# Patient Record
Sex: FEMALE | Race: WHITE | Employment: FULL TIME | URBAN - METROPOLITAN AREA
[De-identification: names, ages, dates, MRNs, and addresses within clinical notes are randomized per-mention and may not be internally consistent; named-entity substitution may affect disease eponyms.]

---

## 2017-02-14 PROBLEM — N95.9 PERIMENOPAUSAL DISORDER: Status: ACTIVE | Noted: 2017-02-14

## 2017-02-14 PROBLEM — F32.81 PREMENSTRUAL DYSPHORIC DISORDER: Status: ACTIVE | Noted: 2017-02-14

## 2017-02-14 PROBLEM — E74.39 GLUCOSE INTOLERANCE (NO MALABSORPTION): Status: ACTIVE | Noted: 2017-02-14

## 2017-08-14 PROBLEM — L70.0 ACNE CYSTICA: Status: ACTIVE | Noted: 2017-08-14

## 2017-11-29 ENCOUNTER — HOSPITAL ENCOUNTER (OUTPATIENT)
Dept: MAMMOGRAPHY | Age: 47
Discharge: HOME | End: 2017-11-29
Attending: OBSTETRICS & GYNECOLOGY
Payer: COMMERCIAL

## 2017-11-29 DIAGNOSIS — Z12.31 VISIT FOR SCREENING MAMMOGRAM: ICD-10-CM

## 2017-11-29 PROCEDURE — 77067 SCR MAMMO BI INCL CAD: CPT

## 2018-08-21 PROBLEM — M54.50 CHRONIC LOW BACK PAIN WITHOUT SCIATICA: Status: ACTIVE | Noted: 2018-08-21

## 2018-08-21 PROBLEM — G89.29 CHRONIC LOW BACK PAIN WITHOUT SCIATICA: Status: ACTIVE | Noted: 2018-08-21

## 2018-08-21 PROBLEM — J30.1 ALLERGIC RHINITIS DUE TO POLLEN: Status: ACTIVE | Noted: 2018-08-21

## 2018-10-04 PROBLEM — M54.40 CHRONIC LOW BACK PAIN WITH SCIATICA: Status: ACTIVE | Noted: 2018-08-21

## 2018-10-30 PROBLEM — M54.16 LUMBAR RADICULOPATHY: Status: ACTIVE | Noted: 2018-10-30

## 2018-10-30 PROBLEM — M43.16 SPONDYLOLISTHESIS AT L4-L5 LEVEL: Status: ACTIVE | Noted: 2018-10-30

## 2018-12-04 ENCOUNTER — HOSPITAL ENCOUNTER (OUTPATIENT)
Dept: MAMMOGRAPHY | Age: 48
Discharge: HOME | End: 2018-12-04
Attending: OBSTETRICS & GYNECOLOGY
Payer: COMMERCIAL

## 2018-12-04 DIAGNOSIS — Z12.39 ENCOUNTER FOR SCREENING FOR MALIGNANT NEOPLASM OF BREAST: ICD-10-CM

## 2018-12-04 PROCEDURE — 77067 SCR MAMMO BI INCL CAD: CPT

## 2018-12-18 NOTE — H&P
[address] [employer]  HISTORY AND PHYSICAL      Reagan Roldan  MR#: [id number]  : 1970  ACCOUNT #: [de-identified]   ADMIT DATE: 2018    CHIEF COMPLAINT:  Left lower extremity radicular pain x8 months. HISTORY OF PRESENT ILLNESS:  A 62-year-old lady with left lower extremity radicular pain refractory to physical therapy, chiropractic treatment and epidural steroid injection. Walking does not aggravate the discomfort as much as sitting. She does wear a TENS unit. MRI scanning of the lumbar spine reveals grade I spondylolisthesis and left-sided spinal stenosis L4-5 with compression. She entered the hospital for surgery as conservative measures have failed. PAST MEDICAL HISTORY:  Significant for tremor migraine and post-menstrual dysphoric disorder. ALLERGIES:  OXYCODONE.    SOCIAL HISTORY:  She is . She is a nonsmoker and minimal ethanol consumer. FAMILY HISTORY:  Positive for hypertension, asthma, hyperlipidemia, coronary artery disease and cancer. REVIEW OF SYSTEMS:  Negative for chest pain, shortness of breath or fatigue. MEDICATIONS:  Listed on the universal medication form attached to the chart. Please refer to that form. PHYSICAL EXAMINATION:  VITAL SIGNS:  Stable, afebrile. HEENT:  Unremarkable. Nose and throat clear. CHEST:  Clear bilaterally. HEART:  Regular rate and rhythm. No murmurs, rubs or gallops. ABDOMEN:  Soft, benign, nontender, no masses. Bowel sounds positive. EXTREMITIES:  Free of deformities. NEUROLOGIC:  Awake, alert, oriented x3. Cranial nerves II-XII intact. Motor strength 5/5. Reflexes symmetric. Sensation normal.  No pathological reflexes present. IMPRESSION:  Grade I spondylolisthesis and spinal stenosis L4-5. Conservative measures have failed. PLAN:  L4-5 transforaminal lumbar interbody fusion.   The risks were thoroughly explained and include bleeding, infection, weakness, numbness, persistent pain, nonunion, malunion incontinence, persistent pain, paralysis and death. She understands and agrees to proceed.       MD ANTOINETTE Prescott/PORFIRIO  D: 12/18/2018 08:01     T: 12/18/2018 08:13  JOB #: [id number]

## 2019-01-04 PROBLEM — M79.2 RADICULAR PAIN IN RIGHT ARM: Status: ACTIVE | Noted: 2019-01-04

## 2019-01-04 PROBLEM — L68.0 FEMALE HIRSUTISM: Status: ACTIVE | Noted: 2019-01-04

## 2019-01-08 ENCOUNTER — HOSPITAL ENCOUNTER (OUTPATIENT)
Dept: GENERAL RADIOLOGY | Age: 49
Discharge: HOME | End: 2019-01-08
Attending: INTERNAL MEDICINE
Payer: COMMERCIAL

## 2019-01-08 DIAGNOSIS — M79.2 RADICULAR PAIN IN RIGHT ARM: ICD-10-CM

## 2019-01-08 PROCEDURE — 72040 X-RAY EXAM NECK SPINE 2-3 VW: CPT

## 2019-01-09 ENCOUNTER — HOSPITAL ENCOUNTER (OUTPATIENT)
Dept: SURGERY | Age: 49
Discharge: HOME | End: 2019-01-09
Payer: COMMERCIAL

## 2019-01-09 VITALS
OXYGEN SATURATION: 100 % | TEMPERATURE: 98 F | HEIGHT: 64 IN | RESPIRATION RATE: 18 BRPM | BODY MASS INDEX: 19.04 KG/M2 | WEIGHT: 111.5 LBS | SYSTOLIC BLOOD PRESSURE: 105 MMHG | HEART RATE: 96 BPM | DIASTOLIC BLOOD PRESSURE: 79 MMHG

## 2019-01-09 LAB
ANION GAP SERPL CALC-SCNC: 10 MMOL/L (ref 7–16)
APPEARANCE UR: CLEAR
BACTERIA SPEC CULT: NORMAL
BASOPHILS # BLD: 0 K/UL (ref 0–0.2)
BASOPHILS NFR BLD: 1 % (ref 0–2)
BILIRUB UR QL: NEGATIVE
BUN SERPL-MCNC: 16 MG/DL (ref 6–23)
CALCIUM SERPL-MCNC: 9.5 MG/DL (ref 8.3–10.4)
CHLORIDE SERPL-SCNC: 100 MMOL/L (ref 98–107)
CO2 SERPL-SCNC: 26 MMOL/L (ref 21–32)
COLOR UR: YELLOW
CREAT SERPL-MCNC: 0.79 MG/DL (ref 0.6–1)
DIFFERENTIAL METHOD BLD: NORMAL
EOSINOPHIL # BLD: 0.1 K/UL (ref 0–0.8)
EOSINOPHIL NFR BLD: 2 % (ref 0.5–7.8)
ERYTHROCYTE [DISTWIDTH] IN BLOOD BY AUTOMATED COUNT: 12.3 % (ref 11.9–14.6)
GLUCOSE SERPL-MCNC: 88 MG/DL (ref 65–100)
GLUCOSE UR STRIP.AUTO-MCNC: NEGATIVE MG/DL
HCT VFR BLD AUTO: 43.2 % (ref 35.8–46.3)
HGB BLD-MCNC: 14.4 G/DL (ref 11.7–15.4)
HGB UR QL STRIP: NEGATIVE
IMM GRANULOCYTES # BLD AUTO: 0 K/UL (ref 0–0.5)
IMM GRANULOCYTES NFR BLD AUTO: 0 % (ref 0–5)
KETONES UR QL STRIP.AUTO: NEGATIVE MG/DL
LEUKOCYTE ESTERASE UR QL STRIP.AUTO: NEGATIVE
LYMPHOCYTES # BLD: 2 K/UL (ref 0.5–4.6)
LYMPHOCYTES NFR BLD: 28 % (ref 13–44)
MCH RBC QN AUTO: 31.5 PG (ref 26.1–32.9)
MCHC RBC AUTO-ENTMCNC: 33.3 G/DL (ref 31.4–35)
MCV RBC AUTO: 94.5 FL (ref 79.6–97.8)
MONOCYTES # BLD: 0.4 K/UL (ref 0.1–1.3)
MONOCYTES NFR BLD: 6 % (ref 4–12)
NEUTS SEG # BLD: 4.6 K/UL (ref 1.7–8.2)
NEUTS SEG NFR BLD: 63 % (ref 43–78)
NITRITE UR QL STRIP.AUTO: NEGATIVE
NRBC # BLD: 0 K/UL (ref 0–0.2)
PH UR STRIP: 8 [PH] (ref 5–9)
PLATELET # BLD AUTO: 300 K/UL (ref 150–450)
PMV BLD AUTO: 10.6 FL (ref 9.4–12.3)
POTASSIUM SERPL-SCNC: 3.6 MMOL/L (ref 3.5–5.1)
PROT UR STRIP-MCNC: NEGATIVE MG/DL
RBC # BLD AUTO: 4.57 M/UL (ref 4.05–5.2)
SERVICE CMNT-IMP: NORMAL
SODIUM SERPL-SCNC: 136 MMOL/L (ref 136–145)
SP GR UR REFRACTOMETRY: 1.01 (ref 1–1.02)
UROBILINOGEN UR QL STRIP.AUTO: 0.2 EU/DL (ref 0.2–1)
WBC # BLD AUTO: 7.2 K/UL (ref 4.3–11.1)

## 2019-01-09 PROCEDURE — 80048 BASIC METABOLIC PNL TOTAL CA: CPT

## 2019-01-09 PROCEDURE — 85025 COMPLETE CBC W/AUTO DIFF WBC: CPT

## 2019-01-09 PROCEDURE — 77030027138 HC INCENT SPIROMETER -A

## 2019-01-09 PROCEDURE — 87641 MR-STAPH DNA AMP PROBE: CPT

## 2019-01-09 PROCEDURE — 81003 URINALYSIS AUTO W/O SCOPE: CPT

## 2019-01-09 NOTE — PERIOP NOTES
Patient verified name & . Order to obtain consent  found in EHR  and matches case posting. TYPE  CASE: 3 Orders:  received Labs per Spine protocol:  MRSA swab, UA, CBC with diff, BMP Labs per anesthesia protocol: T&S s/h for DOS 
EKG/cardiac records  :  Not required Glucose: not required Patient reports taking Metformin but states she is not diagnosed as diabetic. A1C's on record from 2018 are 5.4 on 18 and 5.4 on 8/10/2018 per [facility]. Patient states she requested GP RX this medication when A1C was 5.8 in 2016. This information was shared with Dr. Lopez Cottrell and clarification was requested regarding whether patient is to receive EKG, POC glucose, and Hgb A1C testing according to diabetic protocol. Per Dr. Lopez Cottrell there is no need to treat patient according to diabetic protocol to determine testing and lab needs at this visit. Medication bottles visualized today. Instructed patient to continue previous medications as prescribed prior to surgery and  to take the following medications the day of surgery according to anesthesia guidelines with a small sip of water : Prozac. Patient may take tylenol, zyrtec, flonase, and sumatriptan if needed. Continue all previous medications unless otherwise directed. Instructed patient to hold all vitamins 7 days prior to surgery and the following medications prior to surgery: Goody's powder and Motrin to be held 5 days prior to surgery. Pt viewed Spine Pre-hab video. All further questions were addressed. Pt was provided with antibacterial soap, Hibiclens, long-handled sponge, Spine Recovery booklet and incentive spirometer. Pt correctly demonstrated use of incentive spirometer and instruction to bring it to the hospital on day of surgery. Handouts and all Surgery instructions provided to pt and pt verbalizes understanding. Patient Guide to Surgery Packet provided to patient.  Packet includes Patient Guide to surgery handout, Facts about Pain Management handout, Facts about Urinary Catherization handout, Hand Hygiene handout, Patient Education and Teaching Sheet -Transfusion of Blood and Blood Products handout, and  [facility] frequent question and answer sheet. Guide reviewed with the patient and all questions answered to the satisfaction of the patient. Pt advised to visit www. [email or web address] for more educational information regarding anesthesia and to record any additional questions that arise so that it can be addressed by the anesthesiologist on the morning of surgery. Patient instructed on the following and verbalized understanding: 
Arrive at Main entrance, time of arrival to be called the day before by 1700. Responsible adult must drive patient to and from hospital, stay during surgery and 24 hours postoperatively. Npo after midnight including gum, mints and ice chips. Shower using hibiclens the night before and the morning of surgery. Hibiclens provided to the patient with handout and verbal instructions for use. Leave all valuables at home. Instructed on no jewelry or body piercings on the dos. Bring insurance card and ID. No perfumes, oil, powder, colognes, makeup or  lotions on the skin. Patient verbalized understanding of all instructions and provided all medical/health information to the best of their ability.

## 2019-01-09 NOTE — PERIOP NOTES
Recent Results (from the past 12 hour(s)) CBC WITH AUTOMATED DIFF Collection Time: 01/09/19 12:43 PM  
Result Value Ref Range WBC 7.2 4.3 - 11.1 K/uL  
 RBC 4.57 4.05 - 5.2 M/uL  
 HGB 14.4 11.7 - 15.4 g/dL HCT 43.2 35.8 - 46.3 % MCV 94.5 79.6 - 97.8 FL  
 MCH 31.5 26.1 - 32.9 PG  
 MCHC 33.3 31.4 - 35.0 g/dL  
 RDW 12.3 11.9 - 14.6 % PLATELET 904 235 - 824 K/uL MPV 10.6 9.4 - 12.3 FL ABSOLUTE NRBC 0.00 0.0 - 0.2 K/uL  
 DF AUTOMATED NEUTROPHILS 63 43 - 78 % LYMPHOCYTES 28 13 - 44 % MONOCYTES 6 4.0 - 12.0 % EOSINOPHILS 2 0.5 - 7.8 % BASOPHILS 1 0.0 - 2.0 % IMMATURE GRANULOCYTES 0 0.0 - 5.0 %  
 ABS. NEUTROPHILS 4.6 1.7 - 8.2 K/UL  
 ABS. LYMPHOCYTES 2.0 0.5 - 4.6 K/UL  
 ABS. MONOCYTES 0.4 0.1 - 1.3 K/UL  
 ABS. EOSINOPHILS 0.1 0.0 - 0.8 K/UL  
 ABS. BASOPHILS 0.0 0.0 - 0.2 K/UL  
 ABS. IMM. GRANS. 0.0 0.0 - 0.5 K/UL METABOLIC PANEL, BASIC Collection Time: 01/09/19 12:43 PM  
Result Value Ref Range Sodium 136 136 - 145 mmol/L Potassium 3.6 3.5 - 5.1 mmol/L Chloride 100 98 - 107 mmol/L  
 CO2 26 21 - 32 mmol/L Anion gap 10 7 - 16 mmol/L Glucose 88 65 - 100 mg/dL BUN 16 6 - 23 MG/DL Creatinine 0.79 0.6 - 1.0 MG/DL  
 GFR est AA >60 >60 ml/min/1.73m2 GFR est non-AA >60 >60 ml/min/1.73m2 Calcium 9.5 8.3 - 10.4 MG/DL URINALYSIS W/ RFLX MICROSCOPIC Collection Time: 01/09/19 12:43 PM  
Result Value Ref Range Color YELLOW Appearance CLEAR Specific gravity 1.013 1.001 - 1.023    
 pH (UA) 8.0 5.0 - 9.0 Protein NEGATIVE  NEG mg/dL Glucose NEGATIVE  mg/dL Ketone NEGATIVE  NEG mg/dL Bilirubin NEGATIVE  NEG Blood NEGATIVE  NEG Urobilinogen 0.2 0.2 - 1.0 EU/dL Nitrites NEGATIVE  NEG Leukocyte Esterase NEGATIVE  NEG    
MSSA/MRSA SC BY PCR, NASAL SWAB Collection Time: 01/09/19 12:43 PM  
Result Value Ref Range Special Requests: NO SPECIAL REQUESTS Culture result: SA target not detected. A MRSA NEGATIVE, SA NEGATIVE test result does not preclude MRSA or SA nasal colonization. Labs reviewed and WDL. Routed to surgeon.

## 2019-01-13 ENCOUNTER — ANESTHESIA EVENT (OUTPATIENT)
Dept: SURGERY | Age: 49
DRG: 460 | End: 2019-01-13
Payer: COMMERCIAL

## 2019-01-14 ENCOUNTER — HOSPITAL ENCOUNTER (INPATIENT)
Age: 49
LOS: 2 days | Discharge: HOME | DRG: 460 | End: 2019-01-16
Attending: NEUROLOGICAL SURGERY | Admitting: NEUROLOGICAL SURGERY
Payer: COMMERCIAL

## 2019-01-14 ENCOUNTER — APPOINTMENT (OUTPATIENT)
Dept: GENERAL RADIOLOGY | Age: 49
DRG: 460 | End: 2019-01-14
Attending: NEUROLOGICAL SURGERY
Payer: COMMERCIAL

## 2019-01-14 ENCOUNTER — ANESTHESIA (OUTPATIENT)
Dept: SURGERY | Age: 49
DRG: 460 | End: 2019-01-14
Payer: COMMERCIAL

## 2019-01-14 DIAGNOSIS — M43.16 SPONDYLOLISTHESIS AT L4-L5 LEVEL: ICD-10-CM

## 2019-01-14 LAB
ABO + RH BLD: NORMAL
BLOOD GROUP ANTIBODIES SERPL: NORMAL
HCG UR QL: NEGATIVE
SPECIMEN EXP DATE BLD: NORMAL

## 2019-01-14 PROCEDURE — 74011250637 HC RX REV CODE- 250/637: Performed by: NEUROLOGICAL SURGERY

## 2019-01-14 PROCEDURE — 74011000250 HC RX REV CODE- 250

## 2019-01-14 PROCEDURE — 77030037088 HC TUBE ENDOTRACH ORAL NSL COVD-A: Performed by: ANESTHESIOLOGY

## 2019-01-14 PROCEDURE — 74011250636 HC RX REV CODE- 250/636: Performed by: ANESTHESIOLOGY

## 2019-01-14 PROCEDURE — 74011250636 HC RX REV CODE- 250/636: Performed by: NEUROLOGICAL SURGERY

## 2019-01-14 PROCEDURE — 77030020255 HC SOL INJ LR 1000ML BG

## 2019-01-14 PROCEDURE — 0SG00AJ FUSION OF LUMBAR VERTEBRAL JOINT WITH INTERBODY FUSION DEVICE, POSTERIOR APPROACH, ANTERIOR COLUMN, OPEN APPROACH: ICD-10-PCS | Performed by: NEUROLOGICAL SURGERY

## 2019-01-14 PROCEDURE — 77030018836 HC SOL IRR NACL ICUM -A: Performed by: NEUROLOGICAL SURGERY

## 2019-01-14 PROCEDURE — C1713 ANCHOR/SCREW BN/BN,TIS/BN: HCPCS | Performed by: NEUROLOGICAL SURGERY

## 2019-01-14 PROCEDURE — 77030014007 HC SPNG HEMSTAT J&J -B: Performed by: NEUROLOGICAL SURGERY

## 2019-01-14 PROCEDURE — 74011250636 HC RX REV CODE- 250/636

## 2019-01-14 PROCEDURE — 77030019557 HC ELECTRD VES SEAL MEDT -F: Performed by: NEUROLOGICAL SURGERY

## 2019-01-14 PROCEDURE — 77030012894: Performed by: NEUROLOGICAL SURGERY

## 2019-01-14 PROCEDURE — 97161 PT EVAL LOW COMPLEX 20 MIN: CPT

## 2019-01-14 PROCEDURE — 88304 TISSUE EXAM BY PATHOLOGIST: CPT

## 2019-01-14 PROCEDURE — 77030038601 HC DEV SYS W/CANN LITE BIO STRY -F: Performed by: NEUROLOGICAL SURGERY

## 2019-01-14 PROCEDURE — 72100 X-RAY EXAM L-S SPINE 2/3 VWS: CPT

## 2019-01-14 PROCEDURE — 76010000172 HC OR TIME 2.5 TO 3 HR INTENSV-TIER 1: Performed by: NEUROLOGICAL SURGERY

## 2019-01-14 PROCEDURE — 74011250637 HC RX REV CODE- 250/637: Performed by: ANESTHESIOLOGY

## 2019-01-14 PROCEDURE — 77030039973 HC CGE SPN EXP ORTH -K1: Performed by: NEUROLOGICAL SURGERY

## 2019-01-14 PROCEDURE — 77030032490 HC SLV COMPR SCD KNE COVD -B: Performed by: NEUROLOGICAL SURGERY

## 2019-01-14 PROCEDURE — 76060000036 HC ANESTHESIA 2.5 TO 3 HR: Performed by: NEUROLOGICAL SURGERY

## 2019-01-14 PROCEDURE — 86900 BLOOD TYPING SEROLOGIC ABO: CPT

## 2019-01-14 PROCEDURE — 65270000029 HC RM PRIVATE

## 2019-01-14 PROCEDURE — 77030028270 HC SRGFL HEMSTAT MTRX J&J -C: Performed by: NEUROLOGICAL SURGERY

## 2019-01-14 PROCEDURE — 97530 THERAPEUTIC ACTIVITIES: CPT

## 2019-01-14 PROCEDURE — 74011000250 HC RX REV CODE- 250: Performed by: NEUROLOGICAL SURGERY

## 2019-01-14 PROCEDURE — 77030003029 HC SUT VCRL J&J -B: Performed by: NEUROLOGICAL SURGERY

## 2019-01-14 PROCEDURE — 00NY0ZZ RELEASE LUMBAR SPINAL CORD, OPEN APPROACH: ICD-10-PCS | Performed by: NEUROLOGICAL SURGERY

## 2019-01-14 PROCEDURE — 77030018390 HC SPNG HEMSTAT2 J&J -B: Performed by: NEUROLOGICAL SURGERY

## 2019-01-14 PROCEDURE — 77030030163 HC BN WAX J&J -A: Performed by: NEUROLOGICAL SURGERY

## 2019-01-14 PROCEDURE — 76210000000 HC OR PH I REC 2 TO 2.5 HR: Performed by: NEUROLOGICAL SURGERY

## 2019-01-14 PROCEDURE — 74011000250 HC RX REV CODE- 250: Performed by: ANESTHESIOLOGY

## 2019-01-14 PROCEDURE — 07DS3ZZ EXTRACTION OF VERTEBRAL BONE MARROW, PERCUTANEOUS APPROACH: ICD-10-PCS | Performed by: NEUROLOGICAL SURGERY

## 2019-01-14 PROCEDURE — 77030003451 HC NDL BIOP BN MEDT -C: Performed by: NEUROLOGICAL SURGERY

## 2019-01-14 PROCEDURE — 77030020782 HC GWN BAIR PAWS FLX 3M -B: Performed by: ANESTHESIOLOGY

## 2019-01-14 PROCEDURE — 77030039267 HC ADH SKN EXOFIN S2SG -B: Performed by: NEUROLOGICAL SURGERY

## 2019-01-14 PROCEDURE — 81025 URINE PREGNANCY TEST: CPT

## 2019-01-14 PROCEDURE — 77030034850: Performed by: NEUROLOGICAL SURGERY

## 2019-01-14 PROCEDURE — 77030039194 HC KT NEURO MONITR ASTU -G: Performed by: NEUROLOGICAL SURGERY

## 2019-01-14 PROCEDURE — 77030039425 HC BLD LARYNG TRULITE DISP TELE -A: Performed by: ANESTHESIOLOGY

## 2019-01-14 DEVICE — 45MM ROD, PRE-LORDOSED
Type: IMPLANTABLE DEVICE | Site: SPINE LUMBAR | Status: FUNCTIONAL
Brand: FIREBIRD

## 2019-01-14 DEVICE — 5.5MM X 45MM CORTICAL BONE SCREW
Type: IMPLANTABLE DEVICE | Site: SPINE LUMBAR | Status: FUNCTIONAL
Brand: JANUS

## 2019-01-14 DEVICE — SET SCREW
Type: IMPLANTABLE DEVICE | Site: SPINE LUMBAR | Status: FUNCTIONAL
Brand: FIREBIRD NXG

## 2019-01-14 DEVICE — TOP LOADING BODY
Type: IMPLANTABLE DEVICE | Site: SPINE LUMBAR | Status: FUNCTIONAL
Brand: FIREBIRD NXG

## 2019-01-14 DEVICE — 8.5MM X 10MM WIDE X 24MM NON-LORDOTIC ASSEMBLY
Type: IMPLANTABLE DEVICE | Site: SPINE LUMBAR | Status: FUNCTIONAL
Brand: FORZA XP

## 2019-01-14 DEVICE — GRAFT BNE SUB 5CC 2-4MM GROWTH FACT ALLGRFT OSTEOAMP: Type: IMPLANTABLE DEVICE | Site: SPINE LUMBAR | Status: FUNCTIONAL

## 2019-01-14 RX ORDER — METFORMIN HYDROCHLORIDE 500 MG/1
500 TABLET ORAL
Status: DISCONTINUED | OUTPATIENT
Start: 2019-01-15 | End: 2019-01-16 | Stop reason: HOSPADM

## 2019-01-14 RX ORDER — AMITRIPTYLINE HYDROCHLORIDE 10 MG/1
10 TABLET, FILM COATED ORAL
Status: DISCONTINUED | OUTPATIENT
Start: 2019-01-14 | End: 2019-01-16 | Stop reason: HOSPADM

## 2019-01-14 RX ORDER — SODIUM CHLORIDE 0.9 % (FLUSH) 0.9 %
5-40 SYRINGE (ML) INJECTION AS NEEDED
Status: DISCONTINUED | OUTPATIENT
Start: 2019-01-14 | End: 2019-01-16 | Stop reason: HOSPADM

## 2019-01-14 RX ORDER — GABAPENTIN 300 MG/1
300 CAPSULE ORAL
Status: DISCONTINUED | OUTPATIENT
Start: 2019-01-14 | End: 2019-01-16 | Stop reason: HOSPADM

## 2019-01-14 RX ORDER — MIDAZOLAM HYDROCHLORIDE 1 MG/ML
2 INJECTION, SOLUTION INTRAMUSCULAR; INTRAVENOUS
Status: COMPLETED | OUTPATIENT
Start: 2019-01-14 | End: 2019-01-14

## 2019-01-14 RX ORDER — FERROUS SULFATE, DRIED 160(50) MG
1 TABLET, EXTENDED RELEASE ORAL
Status: DISCONTINUED | OUTPATIENT
Start: 2019-01-15 | End: 2019-01-16 | Stop reason: HOSPADM

## 2019-01-14 RX ORDER — ZOLPIDEM TARTRATE 5 MG/1
10 TABLET ORAL
Status: DISCONTINUED | OUTPATIENT
Start: 2019-01-14 | End: 2019-01-14

## 2019-01-14 RX ORDER — LIDOCAINE HYDROCHLORIDE ANHYDROUS AND DEXTROSE MONOHYDRATE .4; 5 G/100ML; G/100ML
INJECTION, SOLUTION INTRAVENOUS
Status: DISCONTINUED | OUTPATIENT
Start: 2019-01-14 | End: 2019-01-14 | Stop reason: HOSPADM

## 2019-01-14 RX ORDER — ONDANSETRON 2 MG/ML
INJECTION INTRAMUSCULAR; INTRAVENOUS AS NEEDED
Status: DISCONTINUED | OUTPATIENT
Start: 2019-01-14 | End: 2019-01-14 | Stop reason: HOSPADM

## 2019-01-14 RX ORDER — NEOSTIGMINE METHYLSULFATE 1 MG/ML
INJECTION INTRAVENOUS AS NEEDED
Status: DISCONTINUED | OUTPATIENT
Start: 2019-01-14 | End: 2019-01-14 | Stop reason: HOSPADM

## 2019-01-14 RX ORDER — CLINDAMYCIN AND BENZOYL PEROXIDE 10; 50 MG/G; MG/G
GEL TOPICAL DAILY PRN
Status: DISCONTINUED | OUTPATIENT
Start: 2019-01-14 | End: 2019-01-16 | Stop reason: HOSPADM

## 2019-01-14 RX ORDER — HYDROMORPHONE HYDROCHLORIDE 1 MG/ML
1 INJECTION, SOLUTION INTRAMUSCULAR; INTRAVENOUS; SUBCUTANEOUS
Status: DISCONTINUED | OUTPATIENT
Start: 2019-01-14 | End: 2019-01-16 | Stop reason: HOSPADM

## 2019-01-14 RX ORDER — FLUOXETINE 10 MG/1
10 CAPSULE ORAL DAILY
Status: DISCONTINUED | OUTPATIENT
Start: 2019-01-15 | End: 2019-01-16 | Stop reason: HOSPADM

## 2019-01-14 RX ORDER — SODIUM CHLORIDE 0.9 % (FLUSH) 0.9 %
5-40 SYRINGE (ML) INJECTION EVERY 8 HOURS
Status: DISCONTINUED | OUTPATIENT
Start: 2019-01-14 | End: 2019-01-16 | Stop reason: HOSPADM

## 2019-01-14 RX ORDER — HYDROCODONE BITARTRATE AND ACETAMINOPHEN 7.5; 325 MG/1; MG/1
1 TABLET ORAL
Status: DISCONTINUED | OUTPATIENT
Start: 2019-01-14 | End: 2019-01-14

## 2019-01-14 RX ORDER — HYDROMORPHONE HYDROCHLORIDE 2 MG/ML
0.5 INJECTION, SOLUTION INTRAMUSCULAR; INTRAVENOUS; SUBCUTANEOUS
Status: DISCONTINUED | OUTPATIENT
Start: 2019-01-14 | End: 2019-01-14 | Stop reason: HOSPADM

## 2019-01-14 RX ORDER — FLUTICASONE PROPIONATE 50 MCG
2 SPRAY, SUSPENSION (ML) NASAL
Status: DISCONTINUED | OUTPATIENT
Start: 2019-01-14 | End: 2019-01-16 | Stop reason: HOSPADM

## 2019-01-14 RX ORDER — DIPHENHYDRAMINE HYDROCHLORIDE 50 MG/ML
12.5 INJECTION, SOLUTION INTRAMUSCULAR; INTRAVENOUS
Status: DISCONTINUED | OUTPATIENT
Start: 2019-01-14 | End: 2019-01-14 | Stop reason: HOSPADM

## 2019-01-14 RX ORDER — GLYCOPYRROLATE 0.2 MG/ML
INJECTION INTRAMUSCULAR; INTRAVENOUS AS NEEDED
Status: DISCONTINUED | OUTPATIENT
Start: 2019-01-14 | End: 2019-01-14 | Stop reason: HOSPADM

## 2019-01-14 RX ORDER — SODIUM CHLORIDE, SODIUM LACTATE, POTASSIUM CHLORIDE, CALCIUM CHLORIDE 600; 310; 30; 20 MG/100ML; MG/100ML; MG/100ML; MG/100ML
100 INJECTION, SOLUTION INTRAVENOUS CONTINUOUS
Status: DISPENSED | OUTPATIENT
Start: 2019-01-14 | End: 2019-01-15

## 2019-01-14 RX ORDER — CEFAZOLIN SODIUM/WATER 2 G/20 ML
2 SYRINGE (ML) INTRAVENOUS EVERY 8 HOURS
Status: COMPLETED | OUTPATIENT
Start: 2019-01-14 | End: 2019-01-16

## 2019-01-14 RX ORDER — EPHEDRINE SULFATE 50 MG/ML
INJECTION, SOLUTION INTRAVENOUS AS NEEDED
Status: DISCONTINUED | OUTPATIENT
Start: 2019-01-14 | End: 2019-01-14 | Stop reason: HOSPADM

## 2019-01-14 RX ORDER — SUMATRIPTAN 50 MG/1
100 TABLET, FILM COATED ORAL
Status: DISCONTINUED | OUTPATIENT
Start: 2019-01-14 | End: 2019-01-14

## 2019-01-14 RX ORDER — ROCURONIUM BROMIDE 10 MG/ML
INJECTION, SOLUTION INTRAVENOUS AS NEEDED
Status: DISCONTINUED | OUTPATIENT
Start: 2019-01-14 | End: 2019-01-14 | Stop reason: HOSPADM

## 2019-01-14 RX ORDER — LORATADINE 10 MG/1
10 TABLET ORAL DAILY
Status: DISCONTINUED | OUTPATIENT
Start: 2019-01-15 | End: 2019-01-16 | Stop reason: HOSPADM

## 2019-01-14 RX ORDER — KETOROLAC TROMETHAMINE 15 MG/ML
15 INJECTION, SOLUTION INTRAMUSCULAR; INTRAVENOUS
Status: DISCONTINUED | OUTPATIENT
Start: 2019-01-14 | End: 2019-01-16 | Stop reason: HOSPADM

## 2019-01-14 RX ORDER — ACETAMINOPHEN 325 MG/1
650 TABLET ORAL
Status: DISCONTINUED | OUTPATIENT
Start: 2019-01-14 | End: 2019-01-16 | Stop reason: HOSPADM

## 2019-01-14 RX ORDER — ZOLPIDEM TARTRATE 5 MG/1
5 TABLET ORAL
Status: DISCONTINUED | OUTPATIENT
Start: 2019-01-14 | End: 2019-01-16 | Stop reason: HOSPADM

## 2019-01-14 RX ORDER — LIDOCAINE HYDROCHLORIDE 10 MG/ML
0.1 INJECTION INFILTRATION; PERINEURAL AS NEEDED
Status: DISCONTINUED | OUTPATIENT
Start: 2019-01-14 | End: 2019-01-16 | Stop reason: HOSPADM

## 2019-01-14 RX ORDER — SODIUM CHLORIDE, SODIUM LACTATE, POTASSIUM CHLORIDE, CALCIUM CHLORIDE 600; 310; 30; 20 MG/100ML; MG/100ML; MG/100ML; MG/100ML
75 INJECTION, SOLUTION INTRAVENOUS CONTINUOUS
Status: DISCONTINUED | OUTPATIENT
Start: 2019-01-14 | End: 2019-01-16 | Stop reason: HOSPADM

## 2019-01-14 RX ORDER — NALOXONE HYDROCHLORIDE 0.4 MG/ML
0.1 INJECTION, SOLUTION INTRAMUSCULAR; INTRAVENOUS; SUBCUTANEOUS
Status: DISCONTINUED | OUTPATIENT
Start: 2019-01-14 | End: 2019-01-14 | Stop reason: HOSPADM

## 2019-01-14 RX ORDER — KETAMINE HYDROCHLORIDE 100 MG/ML
INJECTION, SOLUTION INTRAMUSCULAR; INTRAVENOUS AS NEEDED
Status: DISCONTINUED | OUTPATIENT
Start: 2019-01-14 | End: 2019-01-14 | Stop reason: HOSPADM

## 2019-01-14 RX ORDER — GABAPENTIN 300 MG/1
300 CAPSULE ORAL ONCE
Status: COMPLETED | OUTPATIENT
Start: 2019-01-14 | End: 2019-01-14

## 2019-01-14 RX ORDER — SCOLOPAMINE TRANSDERMAL SYSTEM 1 MG/1
1 PATCH, EXTENDED RELEASE TRANSDERMAL
Status: DISCONTINUED | OUTPATIENT
Start: 2019-01-14 | End: 2019-01-16 | Stop reason: HOSPADM

## 2019-01-14 RX ORDER — CEFAZOLIN SODIUM 1 G/3ML
INJECTION, POWDER, FOR SOLUTION INTRAMUSCULAR; INTRAVENOUS AS NEEDED
Status: DISCONTINUED | OUTPATIENT
Start: 2019-01-14 | End: 2019-01-14 | Stop reason: HOSPADM

## 2019-01-14 RX ORDER — SPIRONOLACTONE 25 MG/1
50 TABLET ORAL DAILY
Status: DISCONTINUED | OUTPATIENT
Start: 2019-01-15 | End: 2019-01-16 | Stop reason: HOSPADM

## 2019-01-14 RX ORDER — MONTELUKAST SODIUM 10 MG/1
10 TABLET ORAL
Status: DISCONTINUED | OUTPATIENT
Start: 2019-01-14 | End: 2019-01-16 | Stop reason: HOSPADM

## 2019-01-14 RX ORDER — PROPOFOL 10 MG/ML
INJECTION, EMULSION INTRAVENOUS AS NEEDED
Status: DISCONTINUED | OUTPATIENT
Start: 2019-01-14 | End: 2019-01-14 | Stop reason: HOSPADM

## 2019-01-14 RX ORDER — SODIUM CHLORIDE, SODIUM LACTATE, POTASSIUM CHLORIDE, CALCIUM CHLORIDE 600; 310; 30; 20 MG/100ML; MG/100ML; MG/100ML; MG/100ML
75 INJECTION, SOLUTION INTRAVENOUS CONTINUOUS
Status: DISCONTINUED | OUTPATIENT
Start: 2019-01-14 | End: 2019-01-14 | Stop reason: HOSPADM

## 2019-01-14 RX ORDER — FENTANYL CITRATE 50 UG/ML
INJECTION, SOLUTION INTRAMUSCULAR; INTRAVENOUS AS NEEDED
Status: DISCONTINUED | OUTPATIENT
Start: 2019-01-14 | End: 2019-01-14 | Stop reason: HOSPADM

## 2019-01-14 RX ORDER — CEFAZOLIN SODIUM/WATER 2 G/20 ML
2 SYRINGE (ML) INTRAVENOUS ONCE
Status: COMPLETED | OUTPATIENT
Start: 2019-01-14 | End: 2019-01-14

## 2019-01-14 RX ORDER — FLUMAZENIL 0.1 MG/ML
0.2 INJECTION INTRAVENOUS
Status: DISCONTINUED | OUTPATIENT
Start: 2019-01-14 | End: 2019-01-14 | Stop reason: HOSPADM

## 2019-01-14 RX ORDER — LIDOCAINE HYDROCHLORIDE 20 MG/ML
INJECTION, SOLUTION EPIDURAL; INFILTRATION; INTRACAUDAL; PERINEURAL AS NEEDED
Status: DISCONTINUED | OUTPATIENT
Start: 2019-01-14 | End: 2019-01-14 | Stop reason: HOSPADM

## 2019-01-14 RX ADMIN — ONDANSETRON 4 MG: 2 INJECTION INTRAMUSCULAR; INTRAVENOUS at 09:32

## 2019-01-14 RX ADMIN — KETOROLAC TROMETHAMINE 15 MG: 15 INJECTION, SOLUTION INTRAMUSCULAR; INTRAVENOUS at 21:19

## 2019-01-14 RX ADMIN — HYDROMORPHONE HYDROCHLORIDE 0.5 MG: 2 INJECTION, SOLUTION INTRAMUSCULAR; INTRAVENOUS; SUBCUTANEOUS at 11:21

## 2019-01-14 RX ADMIN — GABAPENTIN 300 MG: 300 CAPSULE ORAL at 21:19

## 2019-01-14 RX ADMIN — ROCURONIUM BROMIDE 10 MG: 10 INJECTION, SOLUTION INTRAVENOUS at 08:32

## 2019-01-14 RX ADMIN — EPHEDRINE SULFATE 10 MG: 50 INJECTION, SOLUTION INTRAVENOUS at 08:23

## 2019-01-14 RX ADMIN — HYDROMORPHONE HYDROCHLORIDE 0.5 MG: 2 INJECTION, SOLUTION INTRAMUSCULAR; INTRAVENOUS; SUBCUTANEOUS at 11:15

## 2019-01-14 RX ADMIN — KETAMINE HYDROCHLORIDE 25 MG: 100 INJECTION, SOLUTION INTRAMUSCULAR; INTRAVENOUS at 09:25

## 2019-01-14 RX ADMIN — KETAMINE HYDROCHLORIDE 25 MG: 100 INJECTION, SOLUTION INTRAMUSCULAR; INTRAVENOUS at 08:40

## 2019-01-14 RX ADMIN — Medication 1 AMPULE: at 14:44

## 2019-01-14 RX ADMIN — Medication 10 ML: at 14:45

## 2019-01-14 RX ADMIN — LIDOCAINE HYDROCHLORIDE ANHYDROUS AND DEXTROSE MONOHYDRATE 1.5 MG/KG/HR: .4; 5 INJECTION, SOLUTION INTRAVENOUS at 07:40

## 2019-01-14 RX ADMIN — Medication 5 ML: at 21:20

## 2019-01-14 RX ADMIN — ROCURONIUM BROMIDE 10 MG: 10 INJECTION, SOLUTION INTRAVENOUS at 08:10

## 2019-01-14 RX ADMIN — Medication 2 G: at 16:50

## 2019-01-14 RX ADMIN — MIDAZOLAM HYDROCHLORIDE 2 MG: 2 INJECTION, SOLUTION INTRAMUSCULAR; INTRAVENOUS at 07:20

## 2019-01-14 RX ADMIN — EPHEDRINE SULFATE 5 MG: 50 INJECTION, SOLUTION INTRAVENOUS at 08:10

## 2019-01-14 RX ADMIN — Medication 3 AMPULE: at 06:55

## 2019-01-14 RX ADMIN — Medication 1 AMPULE: at 21:20

## 2019-01-14 RX ADMIN — GLYCOPYRROLATE 0.3 MG: 0.2 INJECTION INTRAMUSCULAR; INTRAVENOUS at 09:35

## 2019-01-14 RX ADMIN — LIDOCAINE HYDROCHLORIDE 40 MG: 20 INJECTION, SOLUTION EPIDURAL; INFILTRATION; INTRACAUDAL; PERINEURAL at 07:38

## 2019-01-14 RX ADMIN — FENTANYL CITRATE 100 MCG: 50 INJECTION, SOLUTION INTRAMUSCULAR; INTRAVENOUS at 07:32

## 2019-01-14 RX ADMIN — Medication 2 G: at 08:04

## 2019-01-14 RX ADMIN — GABAPENTIN 300 MG: 300 CAPSULE ORAL at 07:17

## 2019-01-14 RX ADMIN — PROMETHAZINE HYDROCHLORIDE 3.25 MG: 25 INJECTION INTRAMUSCULAR; INTRAVENOUS at 11:12

## 2019-01-14 RX ADMIN — PROPOFOL 200 MG: 10 INJECTION, EMULSION INTRAVENOUS at 07:38

## 2019-01-14 RX ADMIN — ROCURONIUM BROMIDE 50 MG: 10 INJECTION, SOLUTION INTRAVENOUS at 07:38

## 2019-01-14 RX ADMIN — KETOROLAC TROMETHAMINE 15 MG: 15 INJECTION, SOLUTION INTRAMUSCULAR; INTRAVENOUS at 14:44

## 2019-01-14 RX ADMIN — KETAMINE HYDROCHLORIDE 25 MG: 100 INJECTION, SOLUTION INTRAMUSCULAR; INTRAVENOUS at 07:38

## 2019-01-14 RX ADMIN — EPHEDRINE SULFATE 10 MG: 50 INJECTION, SOLUTION INTRAVENOUS at 08:17

## 2019-01-14 RX ADMIN — FENTANYL CITRATE 50 MCG: 50 INJECTION, SOLUTION INTRAMUSCULAR; INTRAVENOUS at 09:38

## 2019-01-14 RX ADMIN — SODIUM CHLORIDE, SODIUM LACTATE, POTASSIUM CHLORIDE, AND CALCIUM CHLORIDE: 600; 310; 30; 20 INJECTION, SOLUTION INTRAVENOUS at 08:19

## 2019-01-14 RX ADMIN — SODIUM CHLORIDE, SODIUM LACTATE, POTASSIUM CHLORIDE, AND CALCIUM CHLORIDE 100 ML/HR: 600; 310; 30; 20 INJECTION, SOLUTION INTRAVENOUS at 06:55

## 2019-01-14 RX ADMIN — NEOSTIGMINE METHYLSULFATE 2.5 MG: 1 INJECTION INTRAVENOUS at 09:35

## 2019-01-14 RX ADMIN — SODIUM CHLORIDE, SODIUM LACTATE, POTASSIUM CHLORIDE, AND CALCIUM CHLORIDE 75 ML/HR: 600; 310; 30; 20 INJECTION, SOLUTION INTRAVENOUS at 14:56

## 2019-01-14 NOTE — ANESTHESIA POSTPROCEDURE EVALUATION
Procedure(s): L4 5 TLIF. Anesthesia Post Evaluation Multimodal analgesia: multimodal analgesia used between 6 hours prior to anesthesia start to PACU discharge Patient location during evaluation: PACU Patient participation: complete - patient participated Level of consciousness: awake and awake and alert Pain management: adequate Airway patency: patent Anesthetic complications: no 
Cardiovascular status: acceptable Respiratory status: acceptable Hydration status: acceptable Visit Vitals /66 Pulse 87 Temp 36.8 °C (98.2 °F) Resp 15 Ht 5' 4\" (1.626 m) Wt 49.2 kg (108 lb 6.4 oz) SpO2 99% BMI 18.61 kg/m²

## 2019-01-14 NOTE — PROGRESS NOTES
Spiritual Care visit. Initial Visit, Pre Surgery Consult. Visit and prayer before patient goes to surgery. Visit by Megan Kemp M.Ed., Th.B. ,Staff

## 2019-01-14 NOTE — PROGRESS NOTES
01/14/19 1230 Dual Skin Pressure Injury Assessment Dual Skin Pressure Injury Assessment WDL Second Care Provider (Based on [address] [place] [address]) Leonides Leonard, RN Skin Integumentary Skin Integumentary (WDL) X Skin Condition/Temp Warm;Dry Skin Color Appropriate for ethnicity Skin Integrity Incision (comment) (back) Turgor Non-tenting Hair Growth Present Varicosities Absent

## 2019-01-14 NOTE — PROGRESS NOTES
TRANSFER - OUT REPORT: 
 
Verbal report given to Paulina(name) on Raciel Rock  being transferred to 712(unit) for routine post - op Report consisted of patients Situation, Background, Assessment and  
Recommendations(SBAR). Information from the following report(s) SBAR, OR Summary, Procedure Summary, Intake/Output, MAR and Cardiac Rhythm NSR was reviewed with the receiving nurse. Lines:  
Peripheral IV 01/14/19 Right Arm (Active) Site Assessment Clean, dry, & intact 1/14/2019 10:39 AM  
Phlebitis Assessment 0 1/14/2019 10:39 AM  
Infiltration Assessment 0 1/14/2019 10:39 AM  
Dressing Status Clean, dry, & intact 1/14/2019 10:39 AM  
Dressing Type Transparent;Tape 1/14/2019 10:39 AM  
Hub Color/Line Status Infusing;Patent;Green 1/14/2019 10:39 AM  
Alcohol Cap Used No 1/14/2019 10:39 AM  
  
 
Opportunity for questions and clarification was provided. Patient transported with: 
 O2 @ 1 liters VTE prophylaxis orders have been written for Raciel Rock. Patient and family given floor number and nurses name. Family updated re: pt status after security code verified.

## 2019-01-14 NOTE — PROGRESS NOTES
TRANSFER - IN REPORT: 
 
Verbal report received from JUSTIN Armando(name) on [phone]  being received from [employer]) for routine progression of care Report consisted of patients Situation, Background, Assessment and  
Recommendations(SBAR). Information from the following report(s) SBAR, OR Summary, Procedure Summary, MAR and Recent Results was reviewed with the receiving nurse. Opportunity for questions and clarification was provided. Assessment completed upon patients arrival to unit and care assumed.

## 2019-01-14 NOTE — OP NOTES
[facility] REPORT    Mallory Noriega  MR#: [id number]  : 1970  ACCOUNT #: [de-identified]   DATE OF SERVICE: 2019    PREOPERATIVE DIAGNOSIS:  Spondylolisthesis and spinal stenosis, L4-5. POSTOPERATIVE DIAGNOSIS:  Spondylolisthesis and spinal stenosis, L4-5. PROCEDURES PERFORMED:  1. Left L4-5 laminectomy, facetectomy and diskectomy. 2.  Transforaminal lumbar interbody fusion L4-5 with Spine Wave cage, OsteoAMP bone marrow aspirate and autograft bone. 3.  Pedicle screw fusion L4-5 with Orthofix. 4.  Lateral mass fusion L4-5 with OsteoAMP bone marrow aspirate and autograft bone. 5.  Bone marrow aspiration, L4 vertebra. 6.  Morselized autograft harvest.  7.  Continuous intraoperative electromyography monitoring. 8.  Continuous intraoperative fluoroscopy. SURGEON:  Neeru Goodson MD    ASSISTANT:  None. ANESTHESIA:  General endotracheal.    ESTIMATED BLOOD LOSS:  Minimal.    PREPARATION:  ChloraPrep. COMPLICATIONS:  None. FIRST ASSISTANT:  None. SPECIMENS REMOVED:  L4-5 disk. IMPLANTS:  See below. HISTORY OF PRESENT ILLNESS:  A 48-year-old lady with diabetes mellitus who presents with left lower extremity pain and weakness refractory to conservative measures. MRI scan was positive for grade I spondylolisthesis and spinal stenosis primarily on the left at L4-5. She failed aggressive conservative measures and was now admitted for surgery. OPERATIVE NOTE:  The patient was brought to the operating room and was carefully placed under general endotracheal anesthesia without complications. Thrasher catheter, thigh pneumatic hose, AYAN hose and intraoperative EMG leads were placed on the legs bilaterally. The patient was then turned prone on the Carroll frame on top of the OSI bed. C-arm fluoroscopy was used to locate the C4-5 level. Obvious spondylolisthesis was present in the prone position.   The posterior aspect of the back was then prepped in the usual sterile fashion. An incision was made from L3 to S1. Muscles were stripped in the left lateral subperiosteal plane with cautery and elevators and deep retractors were placed. The transverse processes were denuded of soft tissue. Lateral C-arm fluoroscopy confirmed the L4-5 level. Next, laminectomy and facetectomy were carried out with Leksell rongeurs and 3 mm Kerrison rongeurs. Significant bony and ligamentous hypertrophy were present and eventually the L5 nerve roots identified and decompressed along with the dura. Nerve root and dural sac were mobilized medially. Epidural veins were cauterized with bipolar cautery. The disk space was opened with a 15 blade and cleaned with pituitary rongeurs. This was confirmed by C-arm fluoroscopy. The 7, 8, 9 and 10 side biting curets were used to clean the endplates and a generous cleanout of the disk and endplates were achieved. At this point, the L4 pedicle was entered with a straight awl, curved monitoring probe and a ball tip probe. A spinal needle was then inserted into the vertebra and a 5 mL syringe was used to aspirate 5 mL of bone marrow, which was then mixed with OsteoAMP on the back table. C-arm fluoroscopy confirmed correct localization. EMG testing confirmed no response to 20. A 5.5 x 45 mm Orthofix screw was placed under direct vision using C-arm fluoroscopy to guide the screw into the vertebral body without any complications. It was tested, it was noted to be functioning well and no signs of nerve irritation were noted. The nerve root and dural sac were mobilized medially. The disk space was again cleaned with pituitary rongeurs until clear and the removed bone was denuded of soft tissue, morselized into small pieces with a Leksell rongeur and mixed with OsteoAMP and the bone marrow aspirate.   This was packed into the anterior disk space with a bone gun and a nice tight packing of the disk space was achieved. An 8.5 mm Spine Wave titanium cage was inserted under direct vision into the disk space using fluoroscopy without any complications. It was then expanded to a height of 13 mm with good endplate contact and distraction noted. The L5 pedicle was then entered with a straight awl, curved monitoring probe and a ball tip probe and EMG testing was negative at 20. A 5.5 x 45 mm screw was placed. AP and lateral fluoroscopy confirmed good position of the graft and hardware and cage. The lateral recess was then packed with autograft bone, OsteoAMP and bone marrow aspirate until full. A 4.5 cm prebent bob was placed in the universal heads. Locking caps were placed. The bottom screw was tightened with a torque wrench screwdriver, the upper screw was then compressed to the lower screw using a compressor. This was then and tightened and then secured with a torque wrench screwdriver as well. At this point, the wound was irrigated until clear. Surgicel was placed in the epidural space. AP and lateral x-rays confirmed good position of the graft and hardware. A Kishore-Figueroa drain was placed and brought out through a stab incision inferiorly and secured with a 3-0 Vicryl suture and finally the wound was closed. The fascia was closed tightly with interrupted 0 Vicryl, subcutaneous tissue closed with 3-0 Vicryl. Skin was closed with running locked 3-0 nylon suture. Sterile dressings were placed. The drain was hooked to a bulb suction. The patient tolerated the procedure well and was turned supine, awakened, extubated, and taken to PACU in   stable condition. There were no obvious complications.       MD ANTOINETTE Moses / ANUJ  D: 01/14/2019 10:01     T: 01/14/2019 10:30  JOB #: [id number]

## 2019-01-14 NOTE — ANESTHESIA PREPROCEDURE EVALUATION
Anesthetic History PONV Review of Systems / Medical History Patient summary reviewed and pertinent labs reviewed Pulmonary Within defined limits Neuro/Psych Within defined limits Comments: Spondylolisthesis Cardiovascular Within defined limits Exercise tolerance: >4 METS Comments: Denies recent CP, SOB or Palpitations GI/Hepatic/Renal 
Within defined limits Endo/Other Pertinent negatives: Diabetes: Gestational DM only. Pt requested to be put on Metformin due to \"high normal\" HgbA1c. Pt states she does not have DM. Other Findings Physical Exam 
 
Airway Mallampati: I 
TM Distance: > 6 cm Neck ROM: normal range of motion Mouth opening: Normal 
 
 Cardiovascular Regular rate and rhythm,  S1 and S2 normal,  no murmur, click, rub, or gallop Dental 
No notable dental hx Pulmonary Breath sounds clear to auscultation Abdominal 
GI exam deferred Other Findings Anesthetic Plan ASA: 1 Anesthesia type: general 
 
 
 
 
Induction: Intravenous Anesthetic plan and risks discussed with: Patient

## 2019-01-14 NOTE — BRIEF OP NOTE
BRIEF OPERATIVE NOTE Date of Procedure: 1/14/2019 Preoperative Diagnosis: Spondylolisthesis at L4-L5 level [M43.16] Postoperative Diagnosis: Spondylolisthesis at L4-L5 level [M43.16] Procedure(s): L4 5 TLIF Surgeon(s) and Role: 
   * Marizol Veras MD - Primary Surgical Assistant: NONE Surgical Staff: 
Circ-1: Rosario Sheffield RN 
Circ-2: Felicia Quinteros RN Scrub Tech-1: Oskar Jack Scrub Private/Assistant: [facility] Time In Time Out Incision Start 1866 Incision Close Anesthesia: General  
Estimated Blood Loss: MINIMAL Specimens:  
ID Type Source Tests Collected by Time Destination 1 : L4-5 Disc Material Preservative Disc Material  Marizol Veras MD 1/14/2019 0840 Pathology Findings: STENOSIS AND  SPONDYLOLISTHESIS Complications: NONE Implants:  
Implant Name Type Inv. Item Serial No.  Lot No. LRB No. Used Action GRAFT BNE SUB GRAN 2-4MM 5ML -- OSTEOAMP - [id number]  GRAFT BNE SUB GRAN 2-4MM 5ML -- OSTEOAMP [id number] [employer]  N/A 1 Implanted CAGE SPNE EXP [id number] -- Edgar Speck - [id number]  CAGE SPNE EXP [id number] -- FORZA [id number]  ORTHOFIX SPINAL IMPLANTS [id number] N/A 1 Implanted SCR SPNE FIX MIDLNE [id number] -- JANUS - [id number]  SCR SPNE FIX MIDLNE [id number] -- JANUS  ORTHOFIX SPINAL IMPLANTS [id number] N/A 2 Implanted SET SCR -- Penelope Conquest - [id number]  SET SCR -- FIREBIRD NXG  ORTHOFIX SPINAL IMPLANTS [id number] N/A 2 Implanted BODY TOP LD IMPL -- FIREBIRD NXG - [id number]  BODY TOP LD IMPL -- FIREBIRD NXG  ORTHOFIX SPINAL IMPLANTS [id number] N/A 2 Implanted MATTY PRELORDOSED 5.5X45 Artelia Billing - [id number]  MATTY PRELORDOSED 5.5X45 TI -- FIREBIRD  ORTHOFIX SPINAL IMPLANTS [id number] N/A 1 Implanted

## 2019-01-14 NOTE — PROGRESS NOTES
Problem: Mobility Impaired (Adult and Pediatric) Goal: *Acute Goals and Plan of Care (Insert Text) LTG: 
(1.)Ms. Marin will move from supine to sit and sit to supine, scoot up and down and roll side to side INDEPENDENTLY demonstrating log roll technique with bed flat within 7 treatment day(s). (2.)Ms. Marin will transfer from bed to chair and chair to bed with MODIFIED INDEPENDENCE using the least restrictive device within 7treatment day(s). (3.)Ms. Marin will ambulate with MODIFIED INDEPENDENCE for 350+ feet with the least restrictive device within 7 treatment day(s). (4.)Ms. Marin will independently verbalize post op spinal precautions and maintain compliance within 7 days. ________________________________________________________________________________________________ PHYSICAL THERAPY: Initial Assessment, PM 1/14/2019INPATIENT: Hospital Day: 1 Payor: BLUE CROSS / [place] [facility] / Product Type: PPO /  
  
NAME/AGE/GENDER: Lainey Royal is a 51 y.o. female PRIMARY DIAGNOSIS: Spondylolisthesis at L4-L5 level [M43.16] Spondylolisthesis at L4-L5 level Spondylolisthesis at L4-L5 level Procedure(s) (LRB): 
L4 5 TLIF (N/A) Day of Surgery ICD-10: Treatment Diagnosis: · Difficulty in walking, Not elsewhere classified (R26.2) · Other abnormalities of gait and mobility (R26.89) · Low Back Pain (M54.5) Precaution/Allergies: 
Oxycodone ASSESSMENT:  
Ms. Keyana Mac is a 51year old female seen for initial therapy assessment following L4-5 TLIF. She lives with spouse and is completely independent with mobility and ambulation at baseline; works as pharmacist. Presents in supine with expected post op soreness, agreeable to therapy assessment. Reviewed post op log roll technique and spinal precautions. Pt transfers from supine to sitting with supervision.  LE assessment shows AROM WFL with generalized weakness. Immediately reports upon sitting that left LE below knee feels numb, tingling (right LE does as well to a lesser extent). Min handheld assist for sit-stand transfer. Pt demonstrates fair standing balance and is reluctant/cautious about bearing weight on L LE. Worked on standing pre-gait activities, weight shifts, and balance. Ambulation not performed today. Pt returned to supine with cueing and supervision. Positioned comfortably with needs in reach. Anticipate pt will progress well with therapy- she was ambulating without device and working PTA. Will continue with efforts; she is hopeful to go home tomorrow. This section established at most recent assessment PROBLEM LIST (Impairments causing functional limitations): 1. Decreased Transfer Abilities 2. Decreased Ambulation Ability/Technique 3. Decreased Balance 4. Increased Pain 5. Decreased Activity Tolerance INTERVENTIONS PLANNED: (Benefits and precautions of physical therapy have been discussed with the patient.) 1. Balance Exercise 2. Bed Mobility 3. Gait Training 4. Therapeutic Activites 5. Transfer Training TREATMENT PLAN: Frequency/Duration: twice daily for duration of hospital stay Rehabilitation Potential For Stated Goals: Good RECOMMENDED REHABILITATION/EQUIPMENT: (at time of discharge pending progress): Due to the probability of continued deficits (see above) this patient will likely need continued skilled physical therapy after discharge. Equipment:  
? None at this time HISTORY:  
History of Present Injury/Illness (Reason for Referral): 
Pt s/p L4-5 TLIF Past Medical History/Comorbidities: Ms. Chico Jay  has a past medical history of Environmental allergies, History of gestational diabetes, Migraine, PONV (postoperative nausea and vomiting), Premenstrual dysphoric disorder, and Tremor.   Ms. Ch[place] Jay  has a past surgical history that includes hx  section; pr  delivery only (); hx other surgical (); hx other surgical (); hx other surgical (); hx other surgical (); hx breast lumpectomy; hx breast biopsy (2005); hx colonoscopy; and DILATATION AND CURETTAGE HYSTEROSCOPY (N/A, 2011). Social History/Living Environment:  
Home Environment: Private residence # Steps to Enter: 1 One/Two Story Residence: Two story, live on 1st floor Lift Chair Available: No 
Living Alone: No 
Support Systems: Spouse/Significant Other/Partner, Parent, Family member(s) Patient Expects to be Discharged to[de-identified] Private residence Current DME Used/Available at Home: None Prior Level of Function/Work/Activity: 
Lives with family. Completely independent, no DME. Works, drives. Etc. Number of Personal Factors/Comorbidities that affect the Plan of Care: 1-2: MODERATE COMPLEXITY EXAMINATION:  
Most Recent Physical Functioning:  
Gross Assessment: 
AROM: Within functional limits Strength: Generally decreased, functional 
Coordination: Generally decreased, functional 
Sensation: Impaired Posture: 
Posture (WDL): Within defined limits Balance: 
Sitting: Intact Standing: Impaired Standing - Static: Fair Standing - Dynamic : Fair(-) Bed Mobility: 
Rolling: Supervision Supine to Sit: Supervision Sit to Supine: Supervision Scooting: Supervision Wheelchair Mobility: 
  
Transfers: 
Sit to Stand: Minimum assistance Stand to Sit: Contact guard assistance Interventions: Verbal cues; Safety awareness training; Tactile cues Duration: 10 Minutes Gait: 
  
   
  
Body Structures Involved: 1. Nerves 2. Muscles Body Functions Affected: 1. Sensory/Pain 2. Neuromusculoskeletal 
3. Movement Related Activities and Participation Affected: 1. General Tasks and Demands 2. Mobility 3. Domestic Life 4. Community, Social and [place] Number of elements that affect the Plan of Care: 4+: HIGH COMPLEXITY CLINICAL PRESENTATION:  
 Presentation: Stable and uncomplicated: LOW COMPLEXITY CLINICAL DECISION MAKING:  
[facility] AM-PAC 6 Clicks Basic Mobility Inpatient Short Form How much difficulty does the patient currently have. .. Unable A Lot A Little None 1. Turning over in bed (including adjusting bedclothes, sheets and blankets)? [] 1   [] 2   [] 3   [x] 4  
2. Sitting down on and standing up from a chair with arms ( e.g., wheelchair, bedside commode, etc.)   [] 1   [] 2   [x] 3   [] 4  
3. Moving from lying on back to sitting on the side of the bed? [] 1   [] 2   [] 3   [x] 4 How much help from another person does the patient currently need. .. Total A Lot A Little None 4. Moving to and from a bed to a chair (including a wheelchair)? [] 1   [] 2   [x] 3   [] 4  
5. Need to walk in hospital room? [] 1   [] 2   [x] 3   [] 4  
6. Climbing 3-5 steps with a railing? [] 1   [x] 2   [] 3   [] 4  
© 2007, Trustees of [facility] [employer], under license to [employer]. All rights reserved Score:  Initial: 19 Most Recent: X (Date: -- ) Interpretation of Tool:  Represents activities that are increasingly more difficult (i.e. Bed mobility, Transfers, Gait). Score 24 23 22-20 19-15 14-10 9-7 6 Modifier CH CI CJ CK CL CM CN   
 
? Mobility - Walking and Moving Around:  
  - CURRENT STATUS: CK - 40%-59% impaired, limited or restricted  - GOAL STATUS: CI - 1%-19% impaired, limited or restricted  - D/C STATUS:  ---------------To be determined--------------- Payor: BLUE CROSS / Plan[place] BL[place] [facility] / Product Type: PPO /   
 
Medical Necessity:    
· Patient demonstrates good rehab potential due to higher previous functional level. Reason for Services/Other Comments: 
· Patient continues to demonstrate capacity to improve strength, balance, activity tolerance which will increase independence and increase safety. Use of outcome tool(s) and clinical judgement create a POC that gives a: Clear prediction of patient's progress: LOW COMPLEXITY  
  
 
 
 
TREATMENT:  
(In addition to Assessment/Re-Assessment sessions the following treatments were rendered) Pre-treatment Symptoms/Complaints:  \"these legs just don't feel right\" Pain: Initial:  
Pain Intensity 1: 6 Pain Location 1: Back Pain Intervention(s) 1: Repositioned  Post Session:  0/10 Therapeutic Activity: (  10 Minutes ):  Therapeutic activities including Bed mobility with log roll training, sit-stand transfer, standing balance/pre gait activities, and review of post op mobility/precautions to improve mobility, strength, balance and coordination. Required minimal verbal and visual cues   to promote static and dynamic balance in standing and promote motor control of bilateral, lower extremity(s). Braces/Orthotics/Lines/Etc:  
· IV 
· O2 Device: Room air Treatment/Session Assessment:   
· Response to Treatment:  Pt performs mobility with supervision-min A related to dec LE sensation · Interdisciplinary Collaboration:  
o Physical Therapist 
o Registered Nurse · After treatment position/precautions:  
o Supine in bed 
o Bed/Chair-wheels locked 
o Bed in low position 
o Call light within reach 
o RN notified 
o Family at bedside · Compliance with Program/Exercises: Compliant all of the time, Will assess as treatment progresses · Recommendations/Intent for next treatment session: \"Next visit will focus on advancements to more challenging activities and reduction in assistance provided\". Total Treatment Duration: PT Patient Time In/Time Out Time In: 7375 Time Out: 8624 Tasia Force, DPT

## 2019-01-15 PROCEDURE — 74011250636 HC RX REV CODE- 250/636: Performed by: NEUROLOGICAL SURGERY

## 2019-01-15 PROCEDURE — 97530 THERAPEUTIC ACTIVITIES: CPT

## 2019-01-15 PROCEDURE — 97165 OT EVAL LOW COMPLEX 30 MIN: CPT

## 2019-01-15 PROCEDURE — 74011250637 HC RX REV CODE- 250/637: Performed by: NEUROLOGICAL SURGERY

## 2019-01-15 PROCEDURE — 77030020255 HC SOL INJ LR 1000ML BG

## 2019-01-15 PROCEDURE — 65270000029 HC RM PRIVATE

## 2019-01-15 PROCEDURE — 97535 SELF CARE MNGMENT TRAINING: CPT

## 2019-01-15 PROCEDURE — 74011000250 HC RX REV CODE- 250: Performed by: NEUROLOGICAL SURGERY

## 2019-01-15 RX ADMIN — Medication 5 ML: at 23:35

## 2019-01-15 RX ADMIN — KETOROLAC TROMETHAMINE 15 MG: 15 INJECTION, SOLUTION INTRAMUSCULAR; INTRAVENOUS at 03:53

## 2019-01-15 RX ADMIN — Medication 5 ML: at 21:10

## 2019-01-15 RX ADMIN — HYDROMORPHONE HYDROCHLORIDE 1 MG: 1 INJECTION, SOLUTION INTRAMUSCULAR; INTRAVENOUS; SUBCUTANEOUS at 23:34

## 2019-01-15 RX ADMIN — GABAPENTIN 300 MG: 300 CAPSULE ORAL at 21:09

## 2019-01-15 RX ADMIN — HYDROMORPHONE HYDROCHLORIDE 1 MG: 1 INJECTION, SOLUTION INTRAMUSCULAR; INTRAVENOUS; SUBCUTANEOUS at 00:04

## 2019-01-15 RX ADMIN — KETOROLAC TROMETHAMINE 15 MG: 15 INJECTION, SOLUTION INTRAMUSCULAR; INTRAVENOUS at 11:35

## 2019-01-15 RX ADMIN — Medication 10 ML: at 14:30

## 2019-01-15 RX ADMIN — Medication 2 G: at 00:04

## 2019-01-15 RX ADMIN — PROMETHAZINE HYDROCHLORIDE 12.5 MG: 25 INJECTION INTRAMUSCULAR; INTRAVENOUS at 16:06

## 2019-01-15 RX ADMIN — Medication 2 G: at 23:32

## 2019-01-15 RX ADMIN — Medication 2 G: at 15:59

## 2019-01-15 RX ADMIN — Medication 1 AMPULE: at 08:19

## 2019-01-15 RX ADMIN — METFORMIN HYDROCHLORIDE 500 MG: 500 TABLET ORAL at 08:19

## 2019-01-15 RX ADMIN — FLUOXETINE 10 MG: 10 CAPSULE ORAL at 08:19

## 2019-01-15 RX ADMIN — KETOROLAC TROMETHAMINE 15 MG: 15 INJECTION, SOLUTION INTRAMUSCULAR; INTRAVENOUS at 21:09

## 2019-01-15 RX ADMIN — Medication 2 G: at 08:20

## 2019-01-15 RX ADMIN — Medication 1 AMPULE: at 21:10

## 2019-01-15 RX ADMIN — SODIUM CHLORIDE, SODIUM LACTATE, POTASSIUM CHLORIDE, AND CALCIUM CHLORIDE 75 ML/HR: 600; 310; 30; 20 INJECTION, SOLUTION INTRAVENOUS at 03:57

## 2019-01-15 NOTE — PROGRESS NOTES
Problem: Self Care Deficits Care Plan (Adult) Goal: *Acute Goals and Plan of Care (Insert Text) 1. Patient will verbalize and demonstrate understanding of spinal precautions with 100% accuracy during ADLs. 2. Patient will complete lower body bathing and dressing with setup and adaptive equipment as needed. 3. Patient will complete functional transfers with supervision and adaptive equipment as needed. 4. Patient will complete toileting and toilet transfer with supervision. 5. Patient will complete functional mobility of household distances with supervision and adaptive equipment as needed. 6. Patient will demonstrate ability to log roll in bed with modified independence and no verbal cues from therapist.  
 
Timeframe: 7 visits OCCUPATIONAL THERAPY: Initial Assessment, Daily Note and Treatment Day: 1st 1/15/2019INPATIENT: Hospital Day: 2 Payor: BLUE CROSS / Plan: [place] [employer] [place] / Product Type: PPO /  
  
NAME/AGE/GENDER: Melody Coto is a 51 y.o. female PRIMARY DIAGNOSIS:  Spondylolisthesis at L4-L5 level [M43.16] Spondylolisthesis at L4-L5 level Spondylolisthesis at L4-L5 level Procedure(s) (LRB): 
L4 5 TLIF (N/A) 1 Day Post-Op ICD-10: Treatment Diagnosis:  
 · Low Back Pain (M54.5) Precautions/Allergies: 
  spinal precautions Oxycodone ASSESSMENT:  
Ms. Suma Rubi is a 51year old female who is now s/p above procedure. At baseline she lives with her spouse and is typically independent with ADLs, IADLs, and driving. 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 Patient supine in bed upon arrival, agreeable OT evaluation. Reports back pain 6/10. Educated patient on spinal precautions and log roll technique. BUE assessment reveals ROM is WFL. Patient demonstrates bed mobility with SBA and no verbal cues. Able to stand with CGA and RW.  Treatment initiated to include training on adaptive equipment for LB ADLs including sock aid, reacher, long handled sponge, and long handled shoe horn. Patient able to doff socks with minimal assistance and verbal/ visual cues using reacher, then practiced donning socks with sock aid and minimal assistance + verbal and visual cues. Patient then returned to supine with minimal assistance. Patient is very conscientious of maintaining her spinal precautions throughout ADLs. She is functioning below her baseline and would benefit from acute OT to increase independence and safety. Will follow. This section established at most recent assessment PROBLEM LIST (Impairments causing functional limitations): 1. Decreased ADL/Functional Activities 2. Decreased Transfer Abilities 3. Decreased Ambulation Ability/Technique 4. Decreased Balance 5. Increased Pain 6. Decreased Activity Tolerance 7. Decreased Flexibility/Joint Mobility 8. Decreased Knowledge of Precautions INTERVENTIONS PLANNED: (Benefits and precautions of occupational therapy have been discussed with the patient.) 1. Activities of daily living training 2. Adaptive equipment training 3. Therapeutic activity 4. Therapeutic exercise TREATMENT PLAN: Frequency/Duration: Follow patient 3x/ week to address above goals. Rehabilitation Potential For Stated Goals: Good RECOMMENDED REHABILITATION/EQUIPMENT: (at time of discharge pending progress): Due to the probability of continued deficits (see above) this patient will likely need continued skilled occupational therapy after discharge. Equipment:  
? None at this time OCCUPATIONAL PROFILE AND HISTORY:  
History of Present Injury/Illness (Reason for Referral): S/p above procedure Past Medical History/Comorbidities: Ms. Suma Rubi  has a past medical history of Environmental allergies, History of gestational diabetes, Migraine, PONV (postoperative nausea and vomiting), Premenstrual dysphoric disorder, and Tremor.   Ms. Tomas  has a past surgical history that includes hx  section; pr  delivery only (); hx other surgical (); hx other surgical (); hx other surgical (); hx other surgical (); hx breast lumpectomy; hx breast biopsy (2005); hx colonoscopy; and DILATATION AND CURETTAGE HYSTEROSCOPY (N/A, 2011). Social History/Living Environment:  
Home Environment: Private residence # Steps to Enter: 1 One/Two Story Residence: Two story, live on 1st floor Lift Chair Available: No 
Living Alone: No 
Support Systems: Spouse/Significant Other/Partner, Parent Patient Expects to be Discharged to[de-identified] Private residence Current DME Used/Available at Home: Adaptive bathing aides, Adaptive dressing aides, Shower chair, Walker, rolling Tub or Shower Type: Shower Prior Level of Function/Work/Activity: At baseline she lives with her spouse and is typically independent with ADLs, IADLs, and driving. 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 Number of Personal Factors/Comorbidities that affect the Plan of Care: Brief history (0):  LOW COMPLEXITY ASSESSMENT OF OCCUPATIONAL PERFORMANCE[de-identified]  
Activities of Daily Living:  
Basic ADLs (From Assessment) Complex ADLs (From Assessment) Feeding: Setup Oral Facial Hygiene/Grooming: Setup Bathing: Moderate assistance Upper Body Dressing: Setup Lower Body Dressing: Moderate assistance Toileting: Minimum assistance Instrumental ADL Meal Preparation: Maximum assistance Homemaking: Maximum assistance Medication Management: Independent Financial Management: Independent Grooming/Bathing/Dressing Activities of Daily Living Cognitive Retraining Safety/Judgement: Awareness of environment; Fall prevention; Insight into deficits Lower Body Dressing Assistance Socks: Minimum assistance; Compensatory technique training Leg Crossed Method Used: Yes(attempted) Position Performed: Seated edge of bed Cues: Doff;Don;Physical assistance;Verbal cues provided;Visual cues provided Adaptive Equipment Used: Long handled shoe horn;Reacher;Sock aid; Walker Bed/Mat Mobility Rolling: Supervision Supine to Sit: Stand-by assistance Sit to Supine: Minimum assistance Sit to Stand: Contact guard assistance Scooting: Contact guard assistance Most Recent Physical Functioning:  
Gross Assessment: 
AROM: Within functional limits Strength: Within functional limits Coordination: Within functional limits Posture: 
Posture (WDL): Within defined limits Balance: 
Sitting: Intact Standing: Impaired Standing - Static: Fair Standing - Dynamic : Fair Bed Mobility: 
Rolling: Supervision Supine to Sit: Stand-by assistance Sit to Supine: Minimum assistance Scooting: Contact guard assistance Wheelchair Mobility: 
  
Transfers: 
Sit to Stand: Contact guard assistance Stand to Sit: Contact guard assistance Patient Vitals for the past 6 hrs: 
 BP BP Patient Position SpO2 Pulse 01/15/19 1136 103/64 At rest 95 % 76 Mental Status Neurologic State: Alert Orientation Level: Oriented X4 Cognition: Follows commands Perception: Appears intact Perseveration: No perseveration noted Safety/Judgement: Awareness of environment, Fall prevention, Insight into deficits Physical Skills Involved: 1. Range of Motion 2. Balance 3. Pain (acute) Cognitive Skills Affected (resulting in the inability to perform in a timely and safe manner): 1. None Psychosocial Skills Affected: 1. Habits/Routines 2. Environmental Adaptation 3. Social Roles Number of elements that affect the Plan of Care: 5+:  HIGH COMPLEXITY CLINICAL DECISION MAKING:  
[employer] AM-PAC 6 Clicks Daily Activity Inpatient Short Form How much help from another person does the patient currently need. .. Total A Lot A Little None 1.  Putting on and taking off regular lower body clothing? [] 1   [] 2   [x] 3   [] 4  
2. Bathing (including washing, rinsing, drying)? [] 1   [] 2   [x] 3   [] 4  
3. Toileting, which includes using toilet, bedpan or urinal?   [] 1   [] 2   [x] 3   [] 4  
4. Putting on and taking off regular upper body clothing? [] 1   [] 2   [] 3   [x] 4  
5. Taking care of personal grooming such as brushing teeth? [] 1   [] 2   [] 3   [x] 4  
6. Eating meals? [] 1   [] 2   [] 3   [x] 4  
© 2007, Trustees of [facility] [employer], under license to [email or web address]. All rights reserved Score:  Initial: 21 Most Recent: X (Date: -- ) Interpretation of Tool:  Represents activities that are increasingly more difficult (i.e. Bed mobility, Transfers, Gait). Score 24 23 22-20 19-15 14-10 9-7 6 Modifier CH CI CJ CK CL CM CN   
 
? Self Care:  
  - CURRENT STATUS: CJ - 20%-39% impaired, limited or restricted  - GOAL STATUS: CI - 1%-19% impaired, limited or restricted  - D/C STATUS:  ---------------To be determined--------------- Payor: BLUE CROSS / Plan[place] [employer] [facility] / Product Type: PPO /   
 
Medical Necessity:    
· Patient demonstrates good rehab potential due to higher previous functional level. Reason for Services/Other Comments: 
· Patient continues to require present interventions due to patient's inability to care for self while maintaining spinal precautions. Use of outcome tool(s) and clinical judgement create a POC that gives a: MODERATE COMPLEXITY  
 
 
 
TREATMENT:  
(In addition to Assessment/Re-Assessment sessions the following treatments were rendered) Pre-treatment Symptoms/Complaints:   
Pain: Initial:  
Pain Intensity 1: 6(before & after) Pain Location 1: Back Pain Orientation 1: Lower Pain Intervention(s) 1: Repositioned  Post Session:  6 Self Care: (8 minutes): Procedure(s) (per grid) utilized to improve and/or restore self-care/home management as related to lower body dressing and log rolling. Required minimal visual and verbal cueing to facilitate activities of daily living skills, compensatory activities and use of adaptive equipment. Braces/Orthotics/Lines/Etc:  
· drain · O2 Device: Room air Treatment/Session Assessment:   
· Response to Treatment:  Tolerated well · Interdisciplinary Collaboration:  
o Occupational Therapist 
o Registered Nurse · After treatment position/precautions:  
o Supine in bed 
o Bed/Chair-wheels locked 
o Bed in low position 
o Call light within reach 
o RN notified 
o Family at bedside · Compliance with Program/Exercises: Will assess as treatment progresses. · Recommendations/Intent for next treatment session: \"Next visit will focus on advancements to more challenging activities and reduction in assistance provided\". Total Treatment Duration: OT Patient Time In/Time Out Time In: 6263 Time Out: 3700 Rabia Keller OTR/L

## 2019-01-15 NOTE — PROGRESS NOTES
Problem: Mobility Impaired (Adult and Pediatric) Goal: *Acute Goals and Plan of Care (Insert Text) LTG: 
(1.)Ms. Marin will move from supine to sit and sit to supine, scoot up and down and roll side to side INDEPENDENTLY demonstrating log roll technique with bed flat within 7 treatment day(s). (2.)Ms. Marin will transfer from bed to chair and chair to bed with MODIFIED INDEPENDENCE using the least restrictive device within 7treatment day(s). (3.)Ms. Marin will ambulate with MODIFIED INDEPENDENCE for 350+ feet with the least restrictive device within 7 treatment day(s). (4.)Ms. Marin will independently verbalize post op spinal precautions and maintain compliance within 7 days. ________________________________________________________________________________________________ PHYSICAL THERAPY: Daily Note, Treatment Day: 1st, AM 1/15/2019INPATIENT: Hospital Day: 2 Payor: BLUE CROSS / [place] Formerly [facility] / Product Type: PPO /  
  
NAME/AGE/GENDER: Darwin Venegas is a 51 y.o. female PRIMARY DIAGNOSIS: Spondylolisthesis at L4-L5 level [M43.16] Spondylolisthesis at L4-L5 level Spondylolisthesis at L4-L5 level Procedure(s) (LRB): 
L4 5 TLIF (N/A) 1 Day Post-Op ICD-10: Treatment Diagnosis: · Difficulty in walking, Not elsewhere classified (R26.2) · Other abnormalities of gait and mobility (R26.89) · Low Back Pain (M54.5) Precaution/Allergies: 
Oxycodone ASSESSMENT:  
Ms. Anne Marie Levine is a 51year old female seen following L4-5 TLIF. 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 patient requesting to go to the bathroom first.  Bed mobility is slow and with stand by assist   Patient independent with bathroom task.   Gait training with rolling walker x 250 feet with very slow georgette. W/C to follow but the patient did not need it. Patient is returned to the room to supine in bed with stand by assist to close supervision. Good session. Patient is making progress with goals. Needs within reach and  present,   with expected post op soreness, agreeable to therapy assessment. Reviewed post op log roll technique and spinal precautions. Will continue with efforts. This section established at most recent assessment PROBLEM LIST (Impairments causing functional limitations): 1. Decreased Transfer Abilities 2. Decreased Ambulation Ability/Technique 3. Decreased Balance 4. Increased Pain 5. Decreased Activity Tolerance INTERVENTIONS PLANNED: (Benefits and precautions of physical therapy have been discussed with the patient.) 1. Balance Exercise 2. Bed Mobility 3. Gait Training 4. Therapeutic Activites 5. Transfer Training TREATMENT PLAN: Frequency/Duration: twice daily for duration of hospital stay Rehabilitation Potential For Stated Goals: Good RECOMMENDED REHABILITATION/EQUIPMENT: (at time of discharge pending progress): Due to the probability of continued deficits (see above) this patient will likely need continued skilled physical therapy after discharge. Equipment:  
? None at this time HISTORY:  
History of Present Injury/Illness (Reason for Referral): 
Pt s/p L4-5 TLIF Past Medical History/Comorbidities: Ms. Sana Morataya  has a past medical history of Environmental allergies, History of gestational diabetes, Migraine, PONV (postoperative nausea and vomiting), Premenstrual dysphoric disorder, and Tremor.   Ms. Sana Morataya  has a past surgical history that includes hx  section; pr  delivery only (); hx other surgical (); hx other surgical (); hx other surgical (); hx other surgical (); hx breast lumpectomy; hx breast biopsy (2005); hx colonoscopy; and DILATATION AND CURETTAGE HYSTEROSCOPY (N/A, 2011). Social History/Living Environment:  
Home Environment: Private residence # Steps to Enter: 1 One/Two Story Residence: Two story, live on 1st floor Lift Chair Available: No 
Living Alone: No 
Support Systems: Spouse/Significant Other/Partner, Parent, Family member(s) Patient Expects to be Discharged to[de-identified] Private residence Current DME Used/Available at Home: None Prior Level of Function/Work/Activity: 
Lives with family. Completely independent, no DME. Works, drives. Etc. Number of Personal Factors/Comorbidities that affect the Plan of Care: 1-2: MODERATE COMPLEXITY EXAMINATION:  
Most Recent Physical Functioning:  
Gross Assessment: 
  
         
  
Posture: 
  
Balance: 
Sitting: Intact Standing: Impaired Standing - Static: Fair Standing - Dynamic : Fair Bed Mobility: 
Rolling: Supervision Supine to Sit: Supervision Sit to Supine: Supervision Scooting: Supervision Wheelchair Mobility: 
  
Transfers: 
Sit to Stand: Contact guard assistance Stand to Sit: Contact guard assistance Gait: 
  
   
  
Body Structures Involved: 1. Nerves 2. Muscles Body Functions Affected: 1. Sensory/Pain 2. Neuromusculoskeletal 
3. Movement Related Activities and Participation Affected: 1. General Tasks and Demands 2. Mobility 3. Domestic Life 4. Community, Social and [place] Number of elements that affect the Plan of Care: 4+: HIGH COMPLEXITY CLINICAL PRESENTATION:  
Presentation: Stable and uncomplicated: LOW COMPLEXITY CLINICAL DECISION MAKIN[address] [place] [address] [address] [id number] AM-PAC 6 Clicks Basic Mobility Inpatient Short Form How much difficulty does the patient currently have. .. Unable A Lot A Little None 1. Turning over in bed (including adjusting bedclothes, sheets and blankets)? [] 1   [] 2   [] 3   [x] 4  
2.   Sitting down on and standing up from a chair with arms ( e.g., wheelchair, bedside commode, etc.)   [] 1   [] 2   [x] 3   [] 4  
 3. Moving from lying on back to sitting on the side of the bed? [] 1   [] 2   [] 3   [x] 4 How much help from another person does the patient currently need. .. Total A Lot A Little None 4. Moving to and from a bed to a chair (including a wheelchair)? [] 1   [] 2   [x] 3   [] 4  
5. Need to walk in hospital room? [] 1   [] 2   [x] 3   [] 4  
6. Climbing 3-5 steps with a railing? [] 1   [x] 2   [] 3   [] 4  
© 2007, Trustees of [address] [place] [address] [address] [address], under license to [employer]. All rights reserved Score:  Initial: 19 Most Recent: X (Date: -- ) Interpretation of Tool:  Represents activities that are increasingly more difficult (i.e. Bed mobility, Transfers, Gait). Score 24 23 22-20 19-15 14-10 9-7 6 Modifier CH CI CJ CK CL CM CN   
 
? Mobility - Walking and Moving Around:  
  - CURRENT STATUS: CK - 40%-59% impaired, limited or restricted  - GOAL STATUS: CI - 1%-19% impaired, limited or restricted  - D/C STATUS:  ---------------To be determined--------------- Payor: BLUE CROSS / [place] [facility] / Product Type: PPO /   
 
Medical Necessity:    
· Patient demonstrates good rehab potential due to higher previous functional level. Reason for Services/Other Comments: 
· Patient continues to demonstrate capacity to improve strength, balance, activity tolerance which will increase independence and increase safety. Use of outcome tool(s) and clinical judgement create a POC that gives a: Clear prediction of patient's progress: LOW COMPLEXITY  
  
 
 
 
TREATMENT:  
(In addition to Assessment/Re-Assessment sessions the following treatments were rendered) Pre-treatment Symptoms/Complaints: \"My legs are still numb\" Pain: Initial:  
Pain Intensity 1: 6  Post Session:  6/10 Therapeutic Activity: (    25 minutes):   Therapeutic activities including Bed mobility with log roll training, sit-stand transfer, standing balance/pre gait activities, and review of post op mobility/precautions to improve mobility, strength, balance and coordination. Required stand by assist with  minimal verbal and visual cues   to promote static and dynamic balance in standing and promote motor control of bilateral, lower extremity(s). Braces/Orthotics/Lines/Etc:  
· drain back · O2 Device: Room air Treatment/Session Assessment:   
· Response to Treatment:  Tolerated better · Interdisciplinary Collaboration:  
o Physical Therapist 
o Registered Nurse · After treatment position/precautions:  
o Supine in bed 
o Bed/Chair-wheels locked 
o Bed in low position 
o Call light within reach 
o RN notified 
o Family at bedside · Compliance with Program/Exercises: Compliant all of the time · Recommendations/Intent for next treatment session: \"Next visit will focus on advancements to more challenging activities and reduction in assistance provided\". Total Treatment Duration: PT Patient Time In/Time Out Time In: 1101 Time Out: 1126 Sean Castaneda, PTA

## 2019-01-15 NOTE — PROGRESS NOTES
Problem: Falls - Risk of 
Goal: *Absence of Falls Document Gail Godinez Fall Risk and appropriate interventions in the flowsheet. Outcome: Progressing Towards Goal 
Fall Risk Interventions: 
Mobility Interventions: Bed/chair exit alarm, OT consult for ADLs, Patient to call before getting OOB, PT Consult for mobility concerns, PT Consult for assist device competence, Strengthening exercises (ROM-active/passive), Utilize walker, cane, or other assistive device Medication Interventions: Assess postural VS orthostatic hypotension, Bed/chair exit alarm, Patient to call before getting OOB, Teach patient to arise slowly Elimination Interventions: Bed/chair exit alarm, Call light in reach, Patient to call for help with toileting needs, Toilet paper/wipes in reach

## 2019-01-15 NOTE — PROGRESS NOTES
NS   POD#1 AFEBRILE DOING WELL EXCEPT TOUGH TO MOBILIZE 
5/5 POWER 
REYNALDO  90 ML/SHIFT 
A/P NEEDS PT/OT MOBILIZE Lupillo Andrew MD

## 2019-01-15 NOTE — PROGRESS NOTES
Problem: Mobility Impaired (Adult and Pediatric) Goal: *Acute Goals and Plan of Care (Insert Text) LTG: 
(1.)Ms. Marin will move from supine to sit and sit to supine, scoot up and down and roll side to side INDEPENDENTLY demonstrating log roll technique with bed flat within 7 treatment day(s). Goal met 1/15/19 
(2.)Ms. Marin will transfer from bed to chair and chair to bed with MODIFIED INDEPENDENCE using the least restrictive device within 7treatment day(s). (3.)Ms. Marin will ambulate with MODIFIED INDEPENDENCE for 350+ feet with the least restrictive device within 7 treatment day(s). (4.)Ms. Marin will independently verbalize post op spinal precautions and maintain compliance within 7 days. Goal met 1/15/19 
________________________________________________________________________________________________ PHYSICAL THERAPY: Daily Note, Treatment Day: 1st, PM 1/15/2019INPATIENT: Hospital Day: 2 Pay[place] BLUE CROSS / [place] [facility] / Product Type: PPO /  
  
NAME/AGE/GENDER: Aye Puente is a 51 y.o. female PRIMARY DIAGNOSIS: Spondylolisthesis at L4-L5 level [M43.16] Spondylolisthesis at L4-L5 level Spondylolisthesis at L4-L5 level Procedure(s) (LRB): 
L4 5 TLIF (N/A) 1 Day Post-Op ICD-10: Treatment Diagnosis: · Difficulty in walking, Not elsewhere classified (R26.2) · Other abnormalities of gait and mobility (R26.89) · Low Back Pain (M54.5) Precaution/Allergies: 
Oxycodone ASSESSMENT:  
Ms. Anne Adjutant is a 51year old female seen following L4-5 TLIF. 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 Family present. Bed mobility is slow and with close supervision. Gait training with rolling walker x 250 feet with very slow georgette.    Patient is returned to the room to supine in bed modified independent. Good session. Patient is making progress with goals. Needs within reach. Will continue with efforts. This section established at most recent assessment PROBLEM LIST (Impairments causing functional limitations): 1. Decreased Transfer Abilities 2. Decreased Ambulation Ability/Technique 3. Decreased Balance 4. Increased Pain 5. Decreased Activity Tolerance INTERVENTIONS PLANNED: (Benefits and precautions of physical therapy have been discussed with the patient.) 1. Balance Exercise 2. Bed Mobility 3. Gait Training 4. Therapeutic Activites 5. Transfer Training TREATMENT PLAN: Frequency/Duration: twice daily for duration of hospital stay Rehabilitation Potential For Stated Goals: Good RECOMMENDED REHABILITATION/EQUIPMENT: (at time of discharge pending progress): Due to the probability of continued deficits (see above) this patient will likely need continued skilled physical therapy after discharge. Equipment:  
? None at this time HISTORY:  
History of Present Injury/Illness (Reason for Referral): 
Pt s/p L4-5 TLIF Past Medical History/Comorbidities: Ms. Yara Reed  has a past medical history of Environmental allergies, History of gestational diabetes, Migraine, PONV (postoperative nausea and vomiting), Premenstrual dysphoric disorder, and Tremor. Ms. Yara Reed  has a past surgical history that includes hx  section; pr  delivery only (); hx other surgical (); hx other surgical (); hx other surgical (); hx other surgical (); hx breast lumpectomy; hx breast biopsy (2005); hx colonoscopy; and DILATATION AND CURETTAGE HYSTEROSCOPY (N/A, 2011). Social History/Living Environment:  
Home Environment: Private residence # Steps to Enter: 1 One/Two Story Residence: Two story, live on 1st floor Lift Chair Available: No 
Living Alone: No 
Support Systems: Spouse/Significant Other/Partner, Parent Patient Expects to be Discharged to[de-identified] Private residence Current DME Used/Available at Home: Adaptive bathing aides, Adaptive dressing aides, Shower chair, Walker, rolling Tub or Shower Type: Shower Prior Level of Function/Work/Activity: 
Lives with family. Completely independent, no DME. Works, drives. Etc. Number of Personal Factors/Comorbidities that affect the Plan of Care: 1-2: MODERATE COMPLEXITY EXAMINATION:  
Most Recent Physical Functioning:  
Gross Assessment: 
  
         
  
Posture: 
  
Balance: 
Sitting: Intact Standing: Impaired Standing - Static: Fair Standing - Dynamic : Fair Bed Mobility: 
Rolling: Modified independent Supine to Sit: Modified independent Sit to Supine: Modified independent Scooting: Modified independent Wheelchair Mobility: 
  
Transfers: 
Sit to Stand: Stand-by assistance Stand to Sit: Stand-by assistance Gait: 
  
   
  
Body Structures Involved: 1. Nerves 2. Muscles Body Functions Affected: 1. Sensory/Pain 2. Neuromusculoskeletal 
3. Movement Related Activities and Participation Affected: 1. General Tasks and Demands 2. Mobility 3. Domestic Life 4. Community, Social and [place] Number of elements that affect the Plan of Care: 4+: HIGH COMPLEXITY CLINICAL PRESENTATION:  
Presentation: Stable and uncomplicated: LOW COMPLEXITY CLINICAL DECISION MAKIN [place] [address] AM-PAC 6 Clicks Basic Mobility Inpatient Short Form How much difficulty does the patient currently have. .. Unable A Lot A Little None 1. Turning over in bed (including adjusting bedclothes, sheets and blankets)? [] 1   [] 2   [] 3   [x] 4  
2. Sitting down on and standing up from a chair with arms ( e.g., wheelchair, bedside commode, etc.)   [] 1   [] 2   [x] 3   [] 4  
3. Moving from lying on back to sitting on the side of the bed? [] 1   [] 2   [] 3   [x] 4 How much help from another person does the patient currently need. .. Total A Lot A Little None 4. Moving to and from a bed to a chair (including a wheelchair)? [] 1   [] 2   [x] 3   [] 4  
5. Need to walk in hospital room? [] 1   [] 2   [x] 3   [] 4  
6. Climbing 3-5 steps with a railing? [] 1   [x] 2   [] 3   [] 4  
© 2007, Trustees of [facility] [employer], under license to [employer]. All rights reserved Score:  Initial: 19 Most Recent: X (Date: -- ) Interpretation of Tool:  Represents activities that are increasingly more difficult (i.e. Bed mobility, Transfers, Gait). Score 24 23 22-20 19-15 14-10 9-7 6 Modifier CH CI CJ CK CL CM CN   
 
? Mobility - Walking and Moving Around:  
  - CURRENT STATUS: CK - 40%-59% impaired, limited or restricted  - GOAL STATUS: CI - 1%-19% impaired, limited or restricted  - D/C STATUS:  ---------------To be determined--------------- Payor[place] BLUE CR[place] / [place] [facility] / Product Type: PPO /   
 
Medical Necessity:    
· Patient demonstrates good rehab potential due to higher previous functional level. Reason for Services/Other Comments: 
· Patient continues to demonstrate capacity to improve strength, balance, activity tolerance which will increase independence and increase safety. Use of outcome tool(s) and clinical judgement create a POC that gives a: Clear prediction of patient's progress: LOW COMPLEXITY  
  
 
 
 
TREATMENT:  
(In addition to Assessment/Re-Assessment sessions the following treatments were rendered) Pre-treatment Symptoms/Complaints: \"Feeling better\" Pain: Initial:  
Pain Intensity 1: 6  Post Session:  6/10 Therapeutic Activity: (    23 minutes): Therapeutic activities including Bed mobility with log roll training, sit-stand transfer, standing balance/pre gait activities, and review of post op mobility/precautions to improve mobility, strength, balance and coordination.   Required stand by assist with  minimal verbal and visual cues   to promote static and dynamic balance in standing and promote motor control of bilateral, lower extremity(s). Braces/Orthotics/Lines/Etc:  
· drain back · O2 Device: Room air Treatment/Session Assessment:   
· Response to Treatment:  Tolerated better · Interdisciplinary Collaboration:  
o Physical Therapy Assistant 
o Registered Nurse · After treatment position/precautions:  
o Supine in bed 
o Bed/Chair-wheels locked 
o Bed in low position 
o Call light within reach 
o RN notified 
o Family at bedside · Compliance with Program/Exercises: Compliant all of the time · Recommendations/Intent for next treatment session: \"Next visit will focus on advancements to more challenging activities and reduction in assistance provided\". Total Treatment Duration: PT Patient Time In/Time Out Time In: 5279 Time Out: 1923 Jessica Summers, PTA

## 2019-01-15 NOTE — PROGRESS NOTES
Problem: Falls - Risk of 
Goal: *Absence of Falls Document Zandra Lopez Fall Risk and appropriate interventions in the flowsheet. Outcome: Progressing Towards Goal 
Fall Risk Interventions: 
Mobility Interventions: Bed/chair exit alarm, Communicate number of staff needed for ambulation/transfer, OT consult for ADLs, PT Consult for mobility concerns, PT Consult for assist device competence, Patient to call before getting OOB Medication Interventions: Bed/chair exit alarm, Patient to call before getting OOB, Teach patient to arise slowly Elimination Interventions: Bed/chair exit alarm, Call light in reach, Patient to call for help with toileting needs, Toilet paper/wipes in reach, Toileting schedule/hourly rounds

## 2019-01-15 NOTE — PROGRESS NOTES
Attempted OT evaluation. Patient is currently working with PTA. Will check back as schedule permits and patient is available.   
Thank you for the consult,  
Anna Arreguin, OTR/L

## 2019-01-16 ENCOUNTER — HOME HEALTH ADMISSION (OUTPATIENT)
Dept: HOME HEALTH SERVICES | Facility: HOME HEALTH | Age: 49
End: 2019-01-16
Payer: COMMERCIAL

## 2019-01-16 VITALS
WEIGHT: 108.4 LBS | TEMPERATURE: 98.4 F | HEIGHT: 64 IN | OXYGEN SATURATION: 97 % | DIASTOLIC BLOOD PRESSURE: 53 MMHG | HEART RATE: 66 BPM | SYSTOLIC BLOOD PRESSURE: 94 MMHG | BODY MASS INDEX: 18.51 KG/M2 | RESPIRATION RATE: 20 BRPM

## 2019-01-16 PROCEDURE — 74011250637 HC RX REV CODE- 250/637: Performed by: NEUROLOGICAL SURGERY

## 2019-01-16 PROCEDURE — 97530 THERAPEUTIC ACTIVITIES: CPT

## 2019-01-16 PROCEDURE — 74011250636 HC RX REV CODE- 250/636: Performed by: NEUROLOGICAL SURGERY

## 2019-01-16 RX ORDER — HYDROMORPHONE HYDROCHLORIDE 2 MG/1
2 TABLET ORAL
Qty: 30 TAB | Refills: 0 | Status: SHIPPED | OUTPATIENT
Start: 2019-01-16 | End: 2019-02-26

## 2019-01-16 RX ADMIN — METFORMIN HYDROCHLORIDE 500 MG: 500 TABLET ORAL at 08:03

## 2019-01-16 RX ADMIN — Medication 2 G: at 08:09

## 2019-01-16 RX ADMIN — Medication 1 AMPULE: at 08:03

## 2019-01-16 RX ADMIN — KETOROLAC TROMETHAMINE 15 MG: 15 INJECTION, SOLUTION INTRAMUSCULAR; INTRAVENOUS at 08:03

## 2019-01-16 RX ADMIN — Medication 10 ML: at 08:09

## 2019-01-16 NOTE — PROGRESS NOTES
Problem: Falls - Risk of 
Goal: *Absence of Falls Document Zandra Lopez Fall Risk and appropriate interventions in the flowsheet. Outcome: Progressing Towards Goal 
Fall Risk Interventions: 
Mobility Interventions: Bed/chair exit alarm, OT consult for ADLs, Patient to call before getting OOB, PT Consult for mobility concerns, PT Consult for assist device competence, Strengthening exercises (ROM-active/passive), Utilize walker, cane, or other assistive device Medication Interventions: Assess postural VS orthostatic hypotension, Bed/chair exit alarm, Patient to call before getting OOB, Teach patient to arise slowly Elimination Interventions: Bed/chair exit alarm, Call light in reach, Patient to call for help with toileting needs, Toilet paper/wipes in reach

## 2019-01-16 NOTE — DISCHARGE SUMMARY
10 Elayne Russo  MR#: [id number]  : 1970  ACCOUNT #: [de-identified]   ADMIT DATE: 2019  DISCHARGE DATE: 2019    DISCHARGE DIAGNOSES:  1. L4-L5 spondylolisthesis and spinal stenosis. 2.  Allergic rhinitis. 3.  Female hirsutism. 4.  Gestational diabetes. 5.  Glucose intolerance. 6.  Migraine headaches. 7.  Tremor. OPERATIONS AND PROCEDURES:  L4-L5 transforaminal lumbar interbody fusion on 2019. COMPLICATIONS:  None. DISCHARGE CONDITION:  Satisfactory. HISTORY OF PRESENT ILLNESS:  A 43-year-old lady with back and lower extremity discomfort with neurogenic claudication refractory to conservative measures times years. MRI scanning was positive for spinal stenosis and spondylolisthesis L4-L5 and the patient was admitted for surgery as conservative measures had failed. PAST HISTORY:  Listed above. ALLERGIES:  OXYCODONE. PHYSICAL EXAMINATION:  General examination was normal.  Neurologically, she had 5/5 strength, antalgic gait and sciatica in the left leg. Reflexes were symmetric. HOSPITAL COURSE:  She was taken to the operating room on 2019 and underwent successful L4-L5 transforaminal lumbar interbody fusion. Postoperatively, she did well. She was seen by physical therapy and occupational therapy. Her Kishore-Figueroa drain was discontinued on the second postoperative day as the drainage had decreased to 25 mL. She was ambulatory, afebrile and self-sufficient and was able to be discharged home on the second postoperative day. DISCHARGE INSTRUCTIONS:  Diet:  Regular. Activity:  As tolerated. No heavy lifting, bending or automobile driving. Showers are permissible, but no baths. The patient will contact the physician if she develops any fever, drainage, swelling, cellulitis or neurological change.   Return visit in 10-14 days for suture removal.    DISCHARGE MEDICATIONS:  Include admission medicines plus Dilaudid 2 mg q.6 hours p.r.n. She was able to tolerate this medication well. DISPOSITION:  Home.       MD ANTOINETTE Ceja/MOIZ  D: 01/16/2019 07:49     T: 01/16/2019 12:53  JOB #: [id number]

## 2019-01-16 NOTE — PROGRESS NOTES
Problem: Mobility Impaired (Adult and Pediatric) Goal: *Acute Goals and Plan of Care (Insert Text) LTG: 
(1.)Ms. Marin will move from supine to sit and sit to supine, scoot up and down and roll side to side INDEPENDENTLY demonstrating log roll technique with bed flat within 7 treatment day(s). (2.)Ms. Marin will transfer from bed to chair and chair to bed with MODIFIED INDEPENDENCE using the least restrictive device within 7treatment day(s). (3.)Ms. Marin will ambulate with MODIFIED INDEPENDENCE for 350+ feet with the least restrictive device within 7 treatment day(s). (4.)Ms. Marin will independently verbalize post op spinal precautions and maintain compliance within 7 days. ________________________________________________________________________________________________ PHYSICAL THERAPY: Daily Note, Treatment Day: 2nd, AM 1/16/2019INPATIENT: Hospital Day: 3 Payor: BLUE CROSS / Plan: [place] BLUE CROSS [facility] / Product Type: PPO /  
  
NAME/AGE/GENDER: Austin Mcclain is a 51 y.o. female PRIMARY DIAGNOSIS: Spondylolisthesis at L4-L5 level [M43.16] Spondylolisthesis at L4-L5 level Spondylolisthesis at L4-L5 level Procedure(s) (LRB): 
L4 5 TLIF (N/A) 2 Days Post-Op ICD-10: Treatment Diagnosis: · Difficulty in walking, Not elsewhere classified (R26.2) · Other abnormalities of gait and mobility (R26.89) · Low Back Pain (M54.5) Precaution/Allergies: 
Oxycodone ASSESSMENT:  
Ms. Morgan Hagen is a 51year old female seen following L4-5 TLIF. 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 Bed mobility is modified independent. Patient is doing well with the log rolling technique. Standing balance good and patient dons robe in standing.     Gait training with swivel wheeled walker x 250 feet with very slow georgette. Patient is returned to the room to the bathroom. The family states that they will be able to get the patient out of the bathroom. Good session. Patient is making progress with goals  Patient is scheduled to discharge home today. . Will continue with efforts. Patient has her mother swivel walker however it is too low for the patient, talked with the CM about ordering a swivel walker for the patient. Will continue PT efforts. This section established at most recent assessment PROBLEM LIST (Impairments causing functional limitations): 1. Decreased Transfer Abilities 2. Decreased Ambulation Ability/Technique 3. Decreased Balance 4. Increased Pain 5. Decreased Activity Tolerance INTERVENTIONS PLANNED: (Benefits and precautions of physical therapy have been discussed with the patient.) 1. Balance Exercise 2. Bed Mobility 3. Gait Training 4. Therapeutic Activites 5. Transfer Training TREATMENT PLAN: Frequency/Duration: twice daily for duration of hospital stay Rehabilitation Potential For Stated Goals: Good RECOMMENDED REHABILITATION/EQUIPMENT: (at time of discharge pending progress): Due to the probability of continued deficits (see above) this patient will likely need continued skilled physical therapy after discharge. Equipment:  
? None at this time HISTORY:  
History of Present Injury/Illness (Reason for Referral): 
Pt s/p L4-5 TLIF Past Medical History/Comorbidities: Ms. Cameron Frazier  has a past medical history of Environmental allergies, History of gestational diabetes, Migraine, PONV (postoperative nausea and vomiting), Premenstrual dysphoric disorder (2017), and Tremor.   Ms. Cameron Frazier  has a past surgical history that includes hx  section; pr  delivery only (); hx other surgical (); hx other surgical (); hx other surgical (); hx other surgical (); hx breast lumpectomy; hx breast biopsy (2005); and hx colonoscopy. Social History/Living Environment:  
Home Environment: Private residence # Steps to Enter: 1 One/Two Story Residence: Two story, live on 1st floor Lift Chair Available: No 
Living Alone: No 
Support Systems: Spouse/Significant Other/Partner, Parent Patient Expects to be Discharged to[de-identified] Private residence Current DME Used/Available at Home: Adaptive bathing aides, Adaptive dressing aides, Shower chair, Walker, rolling Tub or Shower Type: Shower Prior Level of Function/Work/Activity: 
Lives with family. Completely independent, no DME. Works, drives. Etc. Number of Personal Factors/Comorbidities that affect the Plan of Care: 1-2: MODERATE COMPLEXITY EXAMINATION:  
Most Recent Physical Functioning:  
Gross Assessment: 
  
         
  
Posture: 
  
Balance: 
Sitting: Intact Standing: Impaired Standing - Static: Fair Standing - Dynamic : Fair Bed Mobility: 
Rolling: Modified independent Supine to Sit: Modified independent Scooting: Modified independent Wheelchair Mobility: 
  
Transfers: 
Sit to Stand: Stand-by assistance Stand to Sit: Stand-by assistance Gait: 
  
   
  
Body Structures Involved: 1. Nerves 2. Muscles Body Functions Affected: 1. Sensory/Pain 2. Neuromusculoskeletal 
3. Movement Related Activities and Participation Affected: 1. General Tasks and Demands 2. Mobility 3. Domestic Life 4. Community, Social and [place] Number of elements that affect the Plan of Care: 4+: HIGH COMPLEXITY CLINICAL PRESENTATION:  
Presentation: Stable and uncomplicated: LOW COMPLEXITY CLINICAL DECISION MAKIN \[facility]\"" Box [address] AM-PAC 6 Clicks Basic Mobility Inpatient Short Form How much difficulty does the patient currently have. .. Unable A Lot A Little None 1. Turning over in bed (including adjusting bedclothes, sheets and blankets)? [] 1   [] 2   [] 3   [x] 4 2.  Sitting down on and standing up from a chair with arms ( e.g., wheelchair, bedside commode, etc.)   [] 1   [] 2   [x] 3   [] 4  
3. Moving from lying on back to sitting on the side of the bed? [] 1   [] 2   [] 3   [x] 4 How much help from another person does the patient currently need. .. Total A Lot A Little None 4. Moving to and from a bed to a chair (including a wheelchair)? [] 1   [] 2   [x] 3   [] 4  
5. Need to walk in hospital room? [] 1   [] 2   [x] 3   [] 4  
6. Climbing 3-5 steps with a railing? [] 1   [x] 2   [] 3   [] 4  
© 2007, Trustees of [facility] [employer], under license to [employer]. All rights reserved Score:  Initial: 19 Most Recent: X (Date: -- ) Interpretation of Tool:  Represents activities that are increasingly more difficult (i.e. Bed mobility, Transfers, Gait). Score 24 23 22-20 19-15 14-10 9-7 6 Modifier CH CI CJ CK CL CM CN   
 
? Mobility - Walking and Moving Around:  
  - CURRENT STATUS: CK - 40%-59% impaired, limited or restricted  - GOAL STATUS: CI - 1%-19% impaired, limited or restricted  - D/C STATUS:  ---------------To be determined--------------- Payor: BLUE CROSS / Plan: [facility] / Product Type: PPO /   
 
Medical Necessity:    
· Patient demonstrates good rehab potential due to higher previous functional level. Reason for Services/Other Comments: 
· Patient continues to demonstrate capacity to improve strength, balance, activity tolerance which will increase independence and increase safety. Use of outcome tool(s) and clinical judgement create a POC that gives a: Clear prediction of patient's progress: LOW COMPLEXITY  
  
 
 
 
TREATMENT:  
(In addition to Assessment/Re-Assessment sessions the following treatments were rendered) Pre-treatment Symptoms/Complaints:  \"good morning\" Pain: Initial:  
Pain Intensity 1: (no number given)  Post Session:  0/10 Therapeutic Activity: (    23 minutes): Therapeutic activities including Bed mobility with log roll training, sit-stand transfer, standing balance/pre gait activities, and review of post op mobility/precautions to improve mobility, strength, balance and coordination. Required stand by assist to close supervision and visual cues   to promote static and dynamic balance in standing and promote motor control of bilateral, lower extremity(s). Braces/Orthotics/Lines/Etc:  
· drain back · O2 Device: Room air Treatment/Session Assessment:   
· Response to Treatment:  Tolerated better · Interdisciplinary Collaboration:  
o Physical Therapy Assistant 
o Registered Nurse · After treatment position/precautions:  
o Supine in bed 
o Bed/Chair-wheels locked 
o Bed in low position 
o Call light within reach 
o RN notified 
o Family at bedside · Compliance with Program/Exercises: Compliant all of the time · Recommendations/Intent for next treatment session: \"Next visit will focus on advancements to more challenging activities and reduction in assistance provided\". Total Treatment Duration: PT Patient Time In/Time Out Time In: 0025 Time Out: 1818 Yvette Soto, PTA

## 2019-01-16 NOTE — PROGRESS NOTES
Recommendation from PT/OT that patient would benefit from [facility] services upon discharge. Patient is agreeable to [facility] services. Order, referral and face to face completed for [facility] PT/OT services upon discharge home. Patient states she already has a RW at home. No other needs anticipated. Care Management Interventions PCP Verified by CM: Yes Mode of Transport at Discharge: Other (see comment)(family) Transition of Care Consult (CM Consult): Home Health, Discharge Planning [facility] - INPATIENT: Yes Physical Therapy Consult: Yes Occupational Therapy Consult: Yes Current Support Network: Lives with Spouse, Own Home Confirm Follow Up Transport: Family Plan discussed with Pt/Family/Caregiver: Yes Freedom of Choice Offered: Yes Discharge Location Discharge Placement: Home with home health

## 2019-01-16 NOTE — PROGRESS NOTES
[facility] Face to Face Encounter Patients Name: Gurvinder Rodriguez    YOB: 1970 Ordering Physician: Dr. Brynn Ca Primary Diagnosis: Spondylolisthesis at L4-L5 level [M43.16] Date of Face to Face:   1/16/2019 Face to Face Encounter findings are related to primary reason for home care:   yes. 1. I certify that the patient needs intermittent care as follows: physical therapy: strengthening, stretching/ROM, transfer training, gait/stair training, balance training and pt/caregiver education 
occupational therapy:  ADL safety (ie. cooking, bathing, dressing), ROM and pt/caregiver education 2. I certify that this patient is homebound, that is: 1) patient requires the use of a walker device, special transportation, or assistance of another to leave the home; or 2) patient's condition makes leaving the home medically contraindicated; and 3) patient has a normal inability to leave the home and leaving the home requires considerable and taxing effort. Patient may leave the home for infrequent and short duration for medical reasons, and occasional absences for non-medical reasons. Homebound status is due to the following functional limitations: Patient with strength deficits limiting the performance of all ADL's without caregiver assistance or the use of an assistive device. Patient with poor safety awareness and is at risk for falls without assistance of another person and the use of an assistive device. Patient with poor ambulation endurance limiting their safe ability to ascend/descend the required number of steps to leave the home. 3. I certify that this patient is under my care and that I, or a nurse practitioner or [address] [place] [address], or clinical nurse specialist, or certified nurse midwife, working with me, had a Face-to-Face Encounter that meets the physician Face-to-Face Encounter requirements.   The following are the clinical findings from the Face-to-Face encounter that support the need for skilled services and is a summary of the encounter: See hospital chart See hospital chart Adan Valenzuela RN 
1/16/2019 THE FOLLOWING TO BE COMPLETED BY THE COMMUNITY PHYSICIAN: 
 
I concur with the findings described above from the F2F encounter that this patient is homebound and in need of a skilled service. Certifying Physician: _____________________________________ Printed Certifying Physician Name: _____________________________________ Date: _________________

## 2019-01-16 NOTE — PROGRESS NOTES
NEUROSURGERY 
 
POD #2 
AFEBRILE 
REYNALDO DRAIN OUTPUT DECREASED MOBILIZING WITH PT 
5/5 POWER 
 
A/P 
HOME THIS AFTERNOON AFTER PT 
 
LIZZY Montero/DR. Ashwin Regalado MD 
 
 
 
 
 
 
Physical Exam:  
General:  Alert, cooperative, no distress, appears stated age. Eyes:  Conjunctivae/corneas clear. PERRL, EOMs intact. Fundi benign Ears:  Normal TMs and external ear canals both ears. Nose: Nares normal. Septum midline. Mucosa normal. No drainage or sinus tenderness. Mouth/Throat: Lips, mucosa, and tongue normal. Teeth and gums normal.  
Neck: Supple, symmetrical, trachea midline, no adenopathy, thyroid: no enlargment/tenderness/nodules, no carotid bruit and no JVD. Back:   Symmetric, no curvature. ROM normal. No CVA tenderness. Lungs:   Clear to auscultation bilaterally. Heart:  Regular rate and rhythm, S1, S2 normal, no murmur, click, rub or gallop. Abdomen:   Soft, non-tender. Bowel sounds normal. No masses,  No organomegaly. Extremities: Extremities normal, atraumatic, no cyanosis or edema. Pulses: 2+ and symmetric all extremities. Skin: Skin color, texture, turgor normal. No rashes or lesions Lymph nodes: Cervical, supraclavicular, and axillary nodes normal.  
Appearance: The patient is well developed, well nourished, provides a coherent history and is in no acute distress. Cranial Nerves:   Intact visual fields. Fundi are benign. EDU, EOM's full, no nystagmus, no ptosis. Facial sensation is normal. Corneal reflexes are intact. Facial movement is symmetric. Hearing is normal bilaterally. Palate is midline with normal sternocleidomastoid and trapezius muscles are normal. Tongue is midline. Motor:  5/5 strength in upper and lower proximal and distal muscles. Normal bulk and tone. No fasciculations. Reflexes:   Deep tendon reflexes 2+/4 and symmetrical.  
Sensory:   Normal to touch, pinprick and vibration. Gait:  ASSESSED VIA PT NOTES Tremor:   No tremor noted. Cerebellar:  No cerebellar signs present.

## 2019-01-16 NOTE — PROGRESS NOTES
Pt's D/C instructions completed. Verbalized understanding of all instructions including diet, activity, s/sx to alert MD, medications, wound care, and f/u appointment. Family at [facility].

## 2019-01-16 NOTE — PROGRESS NOTES
Problem: Falls - Risk of 
Goal: *Absence of Falls Document Jonathan Pride Fall Risk and appropriate interventions in the flowsheet. Outcome: Progressing Towards Goal 
Fall Risk Interventions: 
Mobility Interventions: Bed/chair exit alarm, Communicate number of staff needed for ambulation/transfer, Patient to call before getting OOB Medication Interventions: Patient to call before getting OOB, Assess postural VS orthostatic hypotension Elimination Interventions: Call light in reach, Patient to call for help with toileting needs

## 2019-01-16 NOTE — PROGRESS NOTES
Per PT patients home walker is too short for her and she needs a RW with swivel wheels. Order and pertinent patient information faxed to [place] [phone] for equipment to be delivered to home.

## 2019-01-16 NOTE — PROGRESS NOTES
The documentation for this period is being entered following the guidelines as defined in the [facility] (from 2050 to 1740) policy by Bret Gurrola.

## 2019-01-16 NOTE — DISCHARGE INSTRUCTIONS
MAY shower (may shower with dressing on)-->NO tub baths    LEAVE dressing on incision for 3 DAYS-->then may remove   (If dressing starts to fall off may re-secure with tape)    NO lifting anything heavier than 5LBS     NO Bending, Lifting or Twisting    Avoid sitting more than 20 - 30 minutes at a time    NO driving until directed by your doctor    DO NOT take any NSAIDS (either prescribed or over the counter until directed    (Aleve, Ibuprofen, Mobic, etc) as this will interfere with bone healing    CALL Dr. Bety Earl if:  Fever >100.5  ([phone])               Incision becomes red/ swollen/ opens up             Incision has yellow, thick drainage or an odor             Pain is not managed with prescribed medications             Excessive nausea and/or vomiting    Avoid having pets sleep in bed with you until incision is completely healed        DISCHARGE SUMMARY from Nurse    PATIENT INSTRUCTIONS:    After general anesthesia or intravenous sedation, for 24 hours or while taking prescription Narcotics:  · Limit your activities  · Do not drive and operate hazardous machinery  · Do not make important personal or business decisions  · Do  not drink alcoholic beverages  · If you have not urinated within 8 hours after discharge, please contact your surgeon on call.     Report the following to your surgeon:  · Excessive pain, swelling, redness or odor of or around the surgical area  · Temperature over 100.5  · Nausea and vomiting lasting longer than 4 hours or if unable to take medications  · Any signs of decreased circulation or nerve impairment to extremity: change in color, persistent  numbness, tingling, coldness or increase pain  · Any questions    What to do at Home:  Recommended activity: per PT and MD instructions    If you experience any of the following symptoms fever > 100.5, nausea, vomiting, pain without relief of medications, chest pain and/or shortness of breath to ER please follow up with MD.    *  Please give a list of your current medications to your Primary Care Provider. *  Please update this list whenever your medications are discontinued, doses are      changed, or new medications (including over-the-counter products) are added. *  Please carry medication information at all times in case of emergency situations. These are general instructions for a healthy lifestyle:    No smoking/ No tobacco products/ Avoid exposure to second hand smoke  Surgeon General's Warning:  Quitting smoking now greatly reduces serious risk to your health. Obesity, smoking, and sedentary lifestyle greatly increases your risk for illness    A healthy diet, regular physical exercise & weight monitoring are important for maintaining a healthy lifestyle    You may be retaining fluid if you have a history of heart failure or if you experience any of the following symptoms:  Weight gain of 3 pounds or more overnight or 5 pounds in a week, increased swelling in our hands or feet or shortness of breath while lying flat in bed. Please call your doctor as soon as you notice any of these symptoms; do not wait until your next office visit. Recognize signs and symptoms of STROKE:    F-face looks uneven    A-arms unable to move or move unevenly    S-speech slurred or non-existent    T-time-call 911 as soon as signs and symptoms begin-DO NOT go       Back to bed or wait to see if you get better-TIME IS BRAIN. Warning Signs of HEART ATTACK     Call 911 if you have these symptoms:   Chest discomfort. Most heart attacks involve discomfort in the center of the chest that lasts more than a few minutes, or that goes away and comes back. It can feel like uncomfortable pressure, squeezing, fullness, or pain.  Discomfort in other areas of the upper body. Symptoms can include pain or discomfort in one or both arms, the back, neck, jaw, or stomach.  Shortness of breath with or without chest discomfort.    Other signs may include breaking out in a cold sweat, nausea, or lightheadedness. Don't wait more than five minutes to call 911 - MINUTES MATTER! Fast action can save your life. Calling 911 is almost always the fastest way to get lifesaving treatment. Emergency Medical Services staff can begin treatment when they arrive -- up to an hour sooner than if someone gets to the hospital by car. The discharge information has been reviewed with the patient. The patient verbalized understanding. Discharge medications reviewed with the patient and appropriate educational materials and side effects teaching were provided.   ___________________________________________________________________________________________________________________________________

## 2019-01-17 ENCOUNTER — HOME CARE VISIT (OUTPATIENT)
Dept: SCHEDULING | Facility: HOME HEALTH | Age: 49
End: 2019-01-17
Payer: COMMERCIAL

## 2019-01-17 VITALS
RESPIRATION RATE: 18 BRPM | DIASTOLIC BLOOD PRESSURE: 78 MMHG | SYSTOLIC BLOOD PRESSURE: 114 MMHG | HEART RATE: 60 BPM | TEMPERATURE: 99.5 F

## 2019-01-17 PROCEDURE — 400013 HH SOC

## 2019-01-17 PROCEDURE — G0151 HHCP-SERV OF PT,EA 15 MIN: HCPCS

## 2019-01-21 ENCOUNTER — HOME CARE VISIT (OUTPATIENT)
Dept: SCHEDULING | Facility: HOME HEALTH | Age: 49
End: 2019-01-21
Payer: COMMERCIAL

## 2019-01-21 VITALS
SYSTOLIC BLOOD PRESSURE: 98 MMHG | HEART RATE: 80 BPM | RESPIRATION RATE: 17 BRPM | DIASTOLIC BLOOD PRESSURE: 60 MMHG | TEMPERATURE: 99 F

## 2019-01-21 PROCEDURE — G0157 HHC PT ASSISTANT EA 15: HCPCS

## 2019-01-22 ENCOUNTER — HOME CARE VISIT (OUTPATIENT)
Dept: SCHEDULING | Facility: HOME HEALTH | Age: 49
End: 2019-01-22
Payer: COMMERCIAL

## 2019-01-22 VITALS
TEMPERATURE: 98.8 F | DIASTOLIC BLOOD PRESSURE: 62 MMHG | SYSTOLIC BLOOD PRESSURE: 98 MMHG | RESPIRATION RATE: 16 BRPM | HEART RATE: 105 BPM | OXYGEN SATURATION: 99 %

## 2019-01-22 PROCEDURE — G0152 HHCP-SERV OF OT,EA 15 MIN: HCPCS

## 2019-01-23 ENCOUNTER — HOME CARE VISIT (OUTPATIENT)
Dept: SCHEDULING | Facility: HOME HEALTH | Age: 49
End: 2019-01-23
Payer: COMMERCIAL

## 2019-01-23 ENCOUNTER — HOME CARE VISIT (OUTPATIENT)
Dept: HOME HEALTH SERVICES | Facility: HOME HEALTH | Age: 49
End: 2019-01-23
Payer: COMMERCIAL

## 2019-01-23 VITALS
RESPIRATION RATE: 17 BRPM | SYSTOLIC BLOOD PRESSURE: 90 MMHG | DIASTOLIC BLOOD PRESSURE: 58 MMHG | HEART RATE: 88 BPM | TEMPERATURE: 98.7 F

## 2019-01-23 PROCEDURE — G0157 HHC PT ASSISTANT EA 15: HCPCS

## 2019-01-25 ENCOUNTER — HOME CARE VISIT (OUTPATIENT)
Dept: HOME HEALTH SERVICES | Facility: HOME HEALTH | Age: 49
End: 2019-01-25
Payer: COMMERCIAL

## 2019-01-25 ENCOUNTER — HOME CARE VISIT (OUTPATIENT)
Dept: SCHEDULING | Facility: HOME HEALTH | Age: 49
End: 2019-01-25
Payer: COMMERCIAL

## 2019-01-25 VITALS
DIASTOLIC BLOOD PRESSURE: 58 MMHG | RESPIRATION RATE: 17 BRPM | TEMPERATURE: 99.4 F | HEART RATE: 102 BPM | SYSTOLIC BLOOD PRESSURE: 100 MMHG

## 2019-01-25 PROCEDURE — G0157 HHC PT ASSISTANT EA 15: HCPCS

## 2019-01-28 ENCOUNTER — HOME CARE VISIT (OUTPATIENT)
Dept: SCHEDULING | Facility: HOME HEALTH | Age: 49
End: 2019-01-28
Payer: COMMERCIAL

## 2019-01-28 VITALS
RESPIRATION RATE: 17 BRPM | DIASTOLIC BLOOD PRESSURE: 58 MMHG | HEART RATE: 68 BPM | TEMPERATURE: 98.9 F | SYSTOLIC BLOOD PRESSURE: 90 MMHG

## 2019-01-28 PROCEDURE — G0157 HHC PT ASSISTANT EA 15: HCPCS

## 2019-01-30 ENCOUNTER — HOME CARE VISIT (OUTPATIENT)
Dept: SCHEDULING | Facility: HOME HEALTH | Age: 49
End: 2019-01-30
Payer: COMMERCIAL

## 2019-01-30 VITALS
DIASTOLIC BLOOD PRESSURE: 70 MMHG | SYSTOLIC BLOOD PRESSURE: 102 MMHG | TEMPERATURE: 98.3 F | HEART RATE: 60 BPM | RESPIRATION RATE: 17 BRPM

## 2019-01-30 PROCEDURE — G0157 HHC PT ASSISTANT EA 15: HCPCS

## 2019-02-01 ENCOUNTER — HOME CARE VISIT (OUTPATIENT)
Dept: SCHEDULING | Facility: HOME HEALTH | Age: 49
End: 2019-02-01
Payer: COMMERCIAL

## 2019-02-01 VITALS
RESPIRATION RATE: 17 BRPM | HEART RATE: 60 BPM | SYSTOLIC BLOOD PRESSURE: 102 MMHG | TEMPERATURE: 98.7 F | DIASTOLIC BLOOD PRESSURE: 60 MMHG

## 2019-02-01 PROCEDURE — G0157 HHC PT ASSISTANT EA 15: HCPCS

## 2019-02-04 ENCOUNTER — HOME CARE VISIT (OUTPATIENT)
Dept: SCHEDULING | Facility: HOME HEALTH | Age: 49
End: 2019-02-04
Payer: COMMERCIAL

## 2019-02-04 VITALS
RESPIRATION RATE: 17 BRPM | HEART RATE: 60 BPM | TEMPERATURE: 99.9 F | SYSTOLIC BLOOD PRESSURE: 94 MMHG | DIASTOLIC BLOOD PRESSURE: 58 MMHG

## 2019-02-04 PROCEDURE — G0157 HHC PT ASSISTANT EA 15: HCPCS

## 2019-02-06 ENCOUNTER — HOME CARE VISIT (OUTPATIENT)
Dept: SCHEDULING | Facility: HOME HEALTH | Age: 49
End: 2019-02-06
Payer: COMMERCIAL

## 2019-02-06 VITALS
RESPIRATION RATE: 16 BRPM | TEMPERATURE: 98.8 F | OXYGEN SATURATION: 98 % | HEART RATE: 68 BPM | DIASTOLIC BLOOD PRESSURE: 76 MMHG | SYSTOLIC BLOOD PRESSURE: 100 MMHG

## 2019-02-06 PROCEDURE — G0151 HHCP-SERV OF PT,EA 15 MIN: HCPCS

## 2019-02-12 ENCOUNTER — HOME CARE VISIT (OUTPATIENT)
Dept: SCHEDULING | Facility: HOME HEALTH | Age: 49
End: 2019-02-12
Payer: COMMERCIAL

## 2019-02-12 ENCOUNTER — HOME CARE VISIT (OUTPATIENT)
Dept: HOME HEALTH SERVICES | Facility: HOME HEALTH | Age: 49
End: 2019-02-12
Payer: COMMERCIAL

## 2019-02-12 VITALS
HEART RATE: 96 BPM | DIASTOLIC BLOOD PRESSURE: 64 MMHG | TEMPERATURE: 98.6 F | SYSTOLIC BLOOD PRESSURE: 98 MMHG | RESPIRATION RATE: 17 BRPM

## 2019-02-12 PROCEDURE — G0157 HHC PT ASSISTANT EA 15: HCPCS

## 2019-02-14 ENCOUNTER — HOME CARE VISIT (OUTPATIENT)
Dept: SCHEDULING | Facility: HOME HEALTH | Age: 49
End: 2019-02-14
Payer: COMMERCIAL

## 2019-02-14 VITALS
DIASTOLIC BLOOD PRESSURE: 60 MMHG | HEART RATE: 88 BPM | SYSTOLIC BLOOD PRESSURE: 100 MMHG | RESPIRATION RATE: 17 BRPM | TEMPERATURE: 98 F

## 2019-02-18 ENCOUNTER — HOME CARE VISIT (OUTPATIENT)
Dept: SCHEDULING | Facility: HOME HEALTH | Age: 49
End: 2019-02-18
Payer: COMMERCIAL

## 2019-02-18 VITALS
DIASTOLIC BLOOD PRESSURE: 60 MMHG | SYSTOLIC BLOOD PRESSURE: 100 MMHG | RESPIRATION RATE: 17 BRPM | HEART RATE: 80 BPM | TEMPERATURE: 99.6 F

## 2019-02-18 PROCEDURE — G0157 HHC PT ASSISTANT EA 15: HCPCS

## 2019-02-20 ENCOUNTER — HOME CARE VISIT (OUTPATIENT)
Dept: SCHEDULING | Facility: HOME HEALTH | Age: 49
End: 2019-02-20
Payer: COMMERCIAL

## 2019-02-20 VITALS
TEMPERATURE: 98.1 F | DIASTOLIC BLOOD PRESSURE: 60 MMHG | HEART RATE: 80 BPM | RESPIRATION RATE: 17 BRPM | SYSTOLIC BLOOD PRESSURE: 98 MMHG

## 2019-02-20 PROCEDURE — G0157 HHC PT ASSISTANT EA 15: HCPCS

## 2019-02-26 ENCOUNTER — HOSPITAL ENCOUNTER (OUTPATIENT)
Dept: GENERAL RADIOLOGY | Age: 49
Discharge: HOME | End: 2019-02-26
Payer: COMMERCIAL

## 2019-02-26 DIAGNOSIS — M43.16 SPONDYLOLISTHESIS OF LUMBAR REGION: ICD-10-CM

## 2019-02-26 PROCEDURE — 72100 X-RAY EXAM L-S SPINE 2/3 VWS: CPT

## 2019-02-27 ENCOUNTER — HOME CARE VISIT (OUTPATIENT)
Dept: SCHEDULING | Facility: HOME HEALTH | Age: 49
End: 2019-02-27
Payer: COMMERCIAL

## 2019-02-27 VITALS
HEART RATE: 80 BPM | DIASTOLIC BLOOD PRESSURE: 72 MMHG | RESPIRATION RATE: 18 BRPM | TEMPERATURE: 97.4 F | OXYGEN SATURATION: 98 % | SYSTOLIC BLOOD PRESSURE: 100 MMHG

## 2019-02-27 PROCEDURE — G0151 HHCP-SERV OF PT,EA 15 MIN: HCPCS

## 2019-05-14 ENCOUNTER — HOSPITAL ENCOUNTER (OUTPATIENT)
Dept: GENERAL RADIOLOGY | Age: 49
Discharge: HOME | End: 2019-05-14
Payer: COMMERCIAL

## 2019-05-14 DIAGNOSIS — M43.16 SPONDYLOLISTHESIS AT L4-L5 LEVEL: ICD-10-CM

## 2019-05-14 PROCEDURE — 72120 X-RAY BEND ONLY L-S SPINE: CPT

## 2019-08-20 PROBLEM — E28.39 OVARIAN FAILURE: Status: ACTIVE | Noted: 2019-08-20

## 2019-08-20 PROBLEM — E55.9 VITAMIN D DEFICIENCY: Status: ACTIVE | Noted: 2019-08-20

## 2019-08-20 PROBLEM — R53.83 MALAISE AND FATIGUE: Status: ACTIVE | Noted: 2019-08-20

## 2019-08-20 PROBLEM — R53.81 MALAISE AND FATIGUE: Status: ACTIVE | Noted: 2019-08-20

## 2019-08-20 PROBLEM — L70.8 OTHER ACNE: Status: ACTIVE | Noted: 2017-08-14

## 2019-08-20 PROBLEM — F41.9 ANXIETY: Status: ACTIVE | Noted: 2019-08-20

## 2019-10-11 ENCOUNTER — HOSPITAL ENCOUNTER (OUTPATIENT)
Dept: MAMMOGRAPHY | Age: 49
Discharge: HOME | End: 2019-10-11
Attending: INTERNAL MEDICINE
Payer: COMMERCIAL

## 2019-10-11 DIAGNOSIS — E28.39 OVARIAN FAILURE: ICD-10-CM

## 2019-10-11 PROCEDURE — 77080 DXA BONE DENSITY AXIAL: CPT

## 2019-10-16 ENCOUNTER — HOSPITAL ENCOUNTER (OUTPATIENT)
Dept: MAMMOGRAPHY | Age: 49
Discharge: HOME | End: 2019-10-16
Attending: INTERNAL MEDICINE
Payer: COMMERCIAL

## 2019-10-16 DIAGNOSIS — Z12.31 VISIT FOR SCREENING MAMMOGRAM: ICD-10-CM

## 2019-10-16 PROCEDURE — 77067 SCR MAMMO BI INCL CAD: CPT

## 2020-09-18 PROBLEM — R92.2 DENSE BREAST TISSUE ON MAMMOGRAM: Status: ACTIVE | Noted: 2020-09-18

## 2020-10-20 ENCOUNTER — HOSPITAL ENCOUNTER (OUTPATIENT)
Dept: MAMMOGRAPHY | Age: 50
Discharge: HOME | End: 2020-10-20
Attending: OBSTETRICS & GYNECOLOGY
Payer: COMMERCIAL

## 2020-10-20 DIAGNOSIS — Z12.31 VISIT FOR SCREENING MAMMOGRAM: ICD-10-CM

## 2020-10-20 PROCEDURE — 77063 BREAST TOMOSYNTHESIS BI: CPT

## 2021-10-22 PROBLEM — L70.9 ACNE: Status: ACTIVE | Noted: 2017-08-14

## 2021-10-22 PROBLEM — E10.65 HYPERGLYCEMIA DUE TO TYPE 1 DIABETES MELLITUS (HCC): Status: ACTIVE | Noted: 2021-10-22

## 2022-03-18 PROBLEM — E10.65 HYPERGLYCEMIA DUE TO TYPE 1 DIABETES MELLITUS (HCC): Status: ACTIVE | Noted: 2021-10-22

## 2022-03-18 PROBLEM — J30.1 ALLERGIC RHINITIS DUE TO POLLEN: Status: ACTIVE | Noted: 2018-08-21

## 2022-03-18 PROBLEM — F32.81 PREMENSTRUAL DYSPHORIC DISORDER: Status: ACTIVE | Noted: 2017-02-14

## 2022-03-18 PROBLEM — M43.16 SPONDYLOLISTHESIS OF LUMBAR REGION: Status: ACTIVE | Noted: 2018-10-30

## 2022-03-18 PROBLEM — N95.9 PERIMENOPAUSAL DISORDER: Status: ACTIVE | Noted: 2017-02-14

## 2022-03-19 PROBLEM — L70.9 ACNE: Status: ACTIVE | Noted: 2017-08-14

## 2022-03-19 PROBLEM — M54.16 LUMBAR RADICULOPATHY: Status: ACTIVE | Noted: 2018-10-30

## 2022-03-19 PROBLEM — R53.81 MALAISE AND FATIGUE: Status: ACTIVE | Noted: 2019-08-20

## 2022-03-19 PROBLEM — F41.9 ANXIETY: Status: ACTIVE | Noted: 2019-08-20

## 2022-03-19 PROBLEM — E55.9 VITAMIN D DEFICIENCY: Status: ACTIVE | Noted: 2019-08-20

## 2022-03-19 PROBLEM — R92.2 DENSE BREAST TISSUE ON MAMMOGRAM: Status: ACTIVE | Noted: 2020-09-18

## 2022-03-19 PROBLEM — E74.39 GLUCOSE INTOLERANCE: Status: ACTIVE | Noted: 2017-02-14

## 2022-03-19 PROBLEM — E28.39 OVARIAN FAILURE: Status: ACTIVE | Noted: 2019-08-20

## 2022-03-19 PROBLEM — R53.83 MALAISE AND FATIGUE: Status: ACTIVE | Noted: 2019-08-20

## 2022-03-20 PROBLEM — M54.40 CHRONIC LOW BACK PAIN WITH SCIATICA: Status: ACTIVE | Noted: 2018-08-21

## 2022-03-20 PROBLEM — L68.0 FEMALE HIRSUTISM: Status: ACTIVE | Noted: 2019-01-04

## 2022-03-20 PROBLEM — G89.29 CHRONIC LOW BACK PAIN WITH SCIATICA: Status: ACTIVE | Noted: 2018-08-21

## 2022-03-20 PROBLEM — M79.2 RADICULAR PAIN IN RIGHT ARM: Status: ACTIVE | Noted: 2019-01-04

## 2022-08-31 ENCOUNTER — OFFICE VISIT (OUTPATIENT)
Dept: INTERNAL MEDICINE CLINIC | Facility: CLINIC | Age: 52
End: 2022-08-31
Payer: COMMERCIAL

## 2022-08-31 VITALS
BODY MASS INDEX: 19.31 KG/M2 | HEIGHT: 63 IN | SYSTOLIC BLOOD PRESSURE: 102 MMHG | WEIGHT: 109 LBS | DIASTOLIC BLOOD PRESSURE: 70 MMHG

## 2022-08-31 DIAGNOSIS — L25.9 CONTACT DERMATITIS, UNSPECIFIED CONTACT DERMATITIS TYPE, UNSPECIFIED TRIGGER: Primary | ICD-10-CM

## 2022-08-31 PROBLEM — Z86.32 HISTORY OF GESTATIONAL DIABETES: Status: ACTIVE | Noted: 2021-10-22

## 2022-08-31 PROCEDURE — 99213 OFFICE O/P EST LOW 20 MIN: CPT | Performed by: NURSE PRACTITIONER

## 2022-08-31 RX ORDER — TRIAMCINOLONE ACETONIDE 1 MG/G
CREAM TOPICAL
Qty: 45 G | Refills: 0 | Status: SHIPPED | OUTPATIENT
Start: 2022-08-31 | End: 2022-10-26

## 2022-08-31 RX ORDER — PREDNISONE 5 MG/1
TABLET ORAL
Qty: 1 EACH | Refills: 0 | Status: SHIPPED | OUTPATIENT
Start: 2022-08-31 | End: 2022-10-26

## 2022-08-31 ASSESSMENT — PATIENT HEALTH QUESTIONNAIRE - PHQ9
SUM OF ALL RESPONSES TO PHQ QUESTIONS 1-9: 0
SUM OF ALL RESPONSES TO PHQ QUESTIONS 1-9: 0
SUM OF ALL RESPONSES TO PHQ9 QUESTIONS 1 & 2: 0
SUM OF ALL RESPONSES TO PHQ QUESTIONS 1-9: 0
1. LITTLE INTEREST OR PLEASURE IN DOING THINGS: 0
2. FEELING DOWN, DEPRESSED OR HOPELESS: 0
SUM OF ALL RESPONSES TO PHQ QUESTIONS 1-9: 0

## 2022-08-31 NOTE — PROGRESS NOTES
PROGRESS NOTE      Chief Complaint   Patient presents with    Poison Ivy     Pt noted poison ivy on lower abd, behind right ear and arms and neck , Pt c/o area being very itchy        HPI    Rash: Poison ivy exposure. Itchy rash on abdomen, arms and right neck. OTC products without benefit      ASSESSMENT and PLAN    Claudean Coder was seen today for poison ivy. Diagnoses and all orders for this visit:    Contact dermatitis, unspecified contact dermatitis type, unspecified trigger  -     predniSONE 5 MG (21) TBPK; As directed  -     triamcinolone (KENALOG) 0.1 % cream; Apply topically 2 times daily. Medications and side effects reviewed. Up to 10-14 days for topical cream.       Medical problems and test results were reviewed with the patient today. Past Medical History, Past Surgical History, Family history, Social History, and Medications were all reviewed and updated as necessary. Current Outpatient Medications   Medication Sig Dispense Refill    predniSONE 5 MG (21) TBPK As directed 1 each 0    triamcinolone (KENALOG) 0.1 % cream Apply topically 2 times daily.  45 g 0    Calcium Carbonate-Vitamin D (CALCIUM-VITAMIN D) 600-125 MG-UNIT TABS Take 600 mg by mouth daily      cetirizine (ZYRTEC) 10 MG tablet Take 10 mg by mouth daily as needed      clindamycin (CLINDAGEL) 1 % gel Apply topically 2 times daily as needed      fluticasone (FLONASE) 50 MCG/ACT nasal spray 2 sprays by Nasal route daily as needed      ibuprofen (ADVIL;MOTRIN) 200 MG tablet Take 200 mg by mouth every 6 hours as needed      montelukast (SINGULAIR) 10 MG tablet Take by mouth nightly as needed      promethazine (PHENERGAN) 12.5 MG tablet Take 12.5 mg by mouth every 8 hours as needed      SUMAtriptan (IMITREX) 100 MG tablet Take 100 mg by mouth once as needed      Eflornithine HCl 13.9 % CREA Apply topically 2 times daily as needed (Patient not taking: Reported on 8/31/2022)      metFORMIN (GLUCOPHAGE-XR) 500 MG extended release tablet Take 500 mg by mouth Daily with supper (Patient not taking: Reported on 8/31/2022)       No current facility-administered medications for this visit. Allergies   Allergen Reactions    Oxycodone Other (See Comments)     And hydrocodone, severe nausea and vomiting  At times    Codeine Nausea And Vomiting       REVIEW OF SYSTEMS    Review of Systems   Constitutional:  Negative for chills and fever. Skin:  Positive for rash. PHYSICAL EXAM    /70   Ht 5' 3\" (1.6 m)   Wt 109 lb (49.4 kg)   BMI 19.31 kg/m²      Physical Exam  Vitals reviewed. Skin:     Findings: Rash present. Comments: Vesicular marilynn on erythematous base abdomen and neck        FOLLOW UP    Return if symptoms worsen or fail to improve.

## 2022-10-19 DIAGNOSIS — Z00.00 LABORATORY EXAMINATION ORDERED AS PART OF A ROUTINE GENERAL MEDICAL EXAMINATION: Primary | ICD-10-CM

## 2022-10-19 LAB
AVERAGE GLUCOSE: ABNORMAL
HBA1C MFR BLD: 5.7 %

## 2022-10-26 ENCOUNTER — OFFICE VISIT (OUTPATIENT)
Dept: INTERNAL MEDICINE CLINIC | Facility: CLINIC | Age: 52
End: 2022-10-26
Payer: COMMERCIAL

## 2022-10-26 VITALS
BODY MASS INDEX: 19.14 KG/M2 | DIASTOLIC BLOOD PRESSURE: 72 MMHG | HEIGHT: 63 IN | WEIGHT: 108 LBS | SYSTOLIC BLOOD PRESSURE: 118 MMHG

## 2022-10-26 DIAGNOSIS — Z00.00 ROUTINE PHYSICAL EXAMINATION: Primary | ICD-10-CM

## 2022-10-26 DIAGNOSIS — Z86.32 HISTORY OF GESTATIONAL DIABETES: ICD-10-CM

## 2022-10-26 DIAGNOSIS — G43.009 MIGRAINE WITHOUT AURA, NOT INTRACTABLE, WITHOUT STATUS MIGRAINOSUS: ICD-10-CM

## 2022-10-26 DIAGNOSIS — M85.859 OSTEOPENIA OF HIP, UNSPECIFIED LATERALITY: ICD-10-CM

## 2022-10-26 DIAGNOSIS — R73.9 HYPERGLYCEMIA: ICD-10-CM

## 2022-10-26 PROBLEM — M54.40 CHRONIC LOW BACK PAIN WITH SCIATICA: Status: RESOLVED | Noted: 2018-08-21 | Resolved: 2022-10-26

## 2022-10-26 PROBLEM — F32.81 PREMENSTRUAL DYSPHORIC DISORDER: Status: RESOLVED | Noted: 2017-02-14 | Resolved: 2022-10-26

## 2022-10-26 PROBLEM — J30.1 ALLERGIC RHINITIS DUE TO POLLEN: Status: RESOLVED | Noted: 2018-08-21 | Resolved: 2022-10-26

## 2022-10-26 PROBLEM — G89.29 CHRONIC LOW BACK PAIN WITH SCIATICA: Status: RESOLVED | Noted: 2018-08-21 | Resolved: 2022-10-26

## 2022-10-26 PROBLEM — L70.9 ACNE: Status: RESOLVED | Noted: 2017-08-14 | Resolved: 2022-10-26

## 2022-10-26 PROCEDURE — 99396 PREV VISIT EST AGE 40-64: CPT | Performed by: INTERNAL MEDICINE

## 2022-10-26 RX ORDER — METFORMIN HYDROCHLORIDE 500 MG/1
500 TABLET, EXTENDED RELEASE ORAL
Qty: 90 TABLET | Refills: 4 | Status: SHIPPED | OUTPATIENT
Start: 2022-10-26

## 2022-10-26 RX ORDER — PROMETHAZINE HYDROCHLORIDE 12.5 MG/1
12.5 TABLET ORAL EVERY 8 HOURS PRN
Qty: 20 TABLET | Refills: 3 | Status: SHIPPED | OUTPATIENT
Start: 2022-10-26

## 2022-10-26 RX ORDER — SUMATRIPTAN 100 MG/1
100 TABLET, FILM COATED ORAL
Qty: 9 TABLET | Refills: 4 | Status: SHIPPED | OUTPATIENT
Start: 2022-10-26 | End: 2022-10-26

## 2022-10-26 ASSESSMENT — PATIENT HEALTH QUESTIONNAIRE - PHQ9
2. FEELING DOWN, DEPRESSED OR HOPELESS: 0
10. IF YOU CHECKED OFF ANY PROBLEMS, HOW DIFFICULT HAVE THESE PROBLEMS MADE IT FOR YOU TO DO YOUR WORK, TAKE CARE OF THINGS AT HOME, OR GET ALONG WITH OTHER PEOPLE: 0
SUM OF ALL RESPONSES TO PHQ QUESTIONS 1-9: 0
SUM OF ALL RESPONSES TO PHQ9 QUESTIONS 1 & 2: 0
8. MOVING OR SPEAKING SO SLOWLY THAT OTHER PEOPLE COULD HAVE NOTICED. OR THE OPPOSITE, BEING SO FIGETY OR RESTLESS THAT YOU HAVE BEEN MOVING AROUND A LOT MORE THAN USUAL: 0
4. FEELING TIRED OR HAVING LITTLE ENERGY: 0
6. FEELING BAD ABOUT YOURSELF - OR THAT YOU ARE A FAILURE OR HAVE LET YOURSELF OR YOUR FAMILY DOWN: 0
5. POOR APPETITE OR OVEREATING: 0
1. LITTLE INTEREST OR PLEASURE IN DOING THINGS: 0
7. TROUBLE CONCENTRATING ON THINGS, SUCH AS READING THE NEWSPAPER OR WATCHING TELEVISION: 0
SUM OF ALL RESPONSES TO PHQ QUESTIONS 1-9: 0
9. THOUGHTS THAT YOU WOULD BE BETTER OFF DEAD, OR OF HURTING YOURSELF: 0
3. TROUBLE FALLING OR STAYING ASLEEP: 0

## 2022-10-26 NOTE — PROGRESS NOTES
Annual Physical    I have reviewed the patient's medical history in detail and updated the computerized patient record. History     Past Medical History:   Diagnosis Date    Abnormal Papanicolaou smear of cervix     resolved spontaneously    Environmental allergies     History of chicken pox     History of gestational diabetes     Migraine     PONV (postoperative nausea and vomiting)     resolves with antiemetics    Premenstrual dysphoric disorder 2017    Tremor       Past Surgical History:   Procedure Laterality Date    BREAST BIOPSY Right 2005    BREAST LUMPECTOMY       DELIVERY ONLY       SECTION      COLONOSCOPY      COLPOSCOPY      0ver 10 years ago    ORTHOPEDIC SURGERY  2019    Back surgery    OTHER SURGICAL HISTORY      breast biopsy    OTHER SURGICAL HISTORY      colonoscopy    OTHER SURGICAL HISTORY      colposcopy    OTHER SURGICAL HISTORY      D&C hysteroscopy     Current Outpatient Medications   Medication Sig Dispense Refill    Calcium Carbonate-Vitamin D (CALCIUM-VITAMIN D) 600-125 MG-UNIT TABS Take 600 mg by mouth daily      cetirizine (ZYRTEC) 10 MG tablet Take 10 mg by mouth daily as needed      clindamycin (CLINDAGEL) 1 % gel Apply topically 2 times daily as needed      fluticasone (FLONASE) 50 MCG/ACT nasal spray 2 sprays by Nasal route daily as needed      ibuprofen (ADVIL;MOTRIN) 200 MG tablet Take 200 mg by mouth every 6 hours as needed      montelukast (SINGULAIR) 10 MG tablet Take by mouth nightly as needed      promethazine (PHENERGAN) 12.5 MG tablet Take 12.5 mg by mouth every 8 hours as needed      SUMAtriptan (IMITREX) 100 MG tablet Take 100 mg by mouth once as needed       No current facility-administered medications for this visit.      Allergies   Allergen Reactions    Oxycodone Other (See Comments)     And hydrocodone, severe nausea and vomiting  At times    Codeine Nausea And Vomiting Family History   Problem Relation Age of Onset    Elevated Lipids Father     Asthma Father     Pacemaker Mother 79    Hypertension Mother     Ovarian Cancer Neg Hx     Heart Attack Maternal Grandfather     Hypertension Sister     Migraines Sister     Breast Cancer Neg Hx     Colon Cancer Paternal Grandfather         colon 53-62?      Social History     Tobacco Use    Smoking status: Never    Smokeless tobacco: Never   Substance Use Topics    Alcohol use: Yes     Patient Active Problem List   Diagnosis    History of gestational diabetes    Environmental allergies    Premenstrual dysphoric disorder    Spondylolisthesis of lumbar region    Perimenopausal disorder    Allergic rhinitis due to pollen    Ovarian failure    Anxiety    Tremor    Vitamin D deficiency    Dense breast tissue on mammogram    Allergic rhinitis    Acne    Rectal burning    Migraine    Malaise and fatigue    Lumbar radiculopathy    Glucose intolerance    Radicular pain in right arm    Female hirsutism    Chronic low back pain with sciatica    Routine physical examination    Migraine without aura, not intractable, without status migrainosus    Hyperglycemia     Health Maintenance     Health Maintenance   Topic Date Due    Pneumococcal 0-64 years Vaccine (1 - PCV) Never done    Hepatitis C screen  Never done    COVID-19 Vaccine (4 - Booster for Moderna series) 02/05/2022    Flu vaccine (1) 08/01/2022    A1C test (Diabetic or Prediabetic)  10/07/2022    Hepatitis B vaccine (2 of 3 - Risk 3-dose series) 10/26/2023 (Originally 10/5/2012)    Depression Monitoring  08/31/2023    Breast cancer screen  11/11/2023    Cervical cancer screen  09/18/2025    Lipids  10/07/2026    DTaP/Tdap/Td vaccine (2 - Td or Tdap) 03/01/2028    Colorectal Cancer Screen  10/10/2029    Shingles vaccine  Completed    HIV screen  Completed    Hepatitis A vaccine  Aged Out    Hib vaccine  Aged Out    Meningococcal (ACWY) vaccine  Aged [employer] History   Administered Date(s) Administered    COVID-19, MODERNA BLUE border, Primary or Immunocompromised, (age 12y+), IM, 100 mcg/0.5mL 01/14/2021, 02/11/2021    COVID-19, MODERNA Booster BLUE border, (age 18y+), IM, 50mcg/0.25mL 12/11/2021    Hepatitis B Adult (Recombivax HB) 09/07/2012    Influenza Live, intranasal, LAIV3 10/01/2015    Influenza Virus Vaccine 10/13/2017, 10/12/2018    Tdap (Boostrix, Adacel) 03/01/2018    Zoster Recombinant (Shingrix) 06/17/2019, 08/17/2019     Review of Systems   Review of Systems   Constitutional:  Negative for appetite change. Psychiatric/Behavioral:  Negative for sleep disturbance. Physical Examination   /72   Ht 5' 3\" (1.6 m)   Wt 108 lb (49 kg)   BMI 19.13 kg/m²       Physical Exam  Constitutional:       Appearance: Normal appearance. HENT:      Head: Normocephalic. Right Ear: Tympanic membrane normal.      Left Ear: Tympanic membrane normal.      Mouth/Throat:      Mouth: Mucous membranes are moist.      Pharynx: Oropharynx is clear. Eyes:      Extraocular Movements: Extraocular movements intact. Pupils: Pupils are equal, round, and reactive to light. Neck:      Thyroid: No thyromegaly. Vascular: No carotid bruit. Cardiovascular:      Rate and Rhythm: Normal rate and regular rhythm. Pulses: Normal pulses. Heart sounds: No murmur heard. Pulmonary:      Effort: Pulmonary effort is normal.      Breath sounds: Normal breath sounds. Abdominal:      General: Abdomen is flat. Bowel sounds are normal.      Palpations: Abdomen is soft. There is no hepatomegaly or splenomegaly. Musculoskeletal:         General: No swelling. Cervical back: Normal range of motion. Right lower leg: No edema. Left lower leg: No edema. Skin:     General: Skin is warm and dry. Neurological:      General: No focal deficit present.       Mental Status: She is alert and oriented to person, place, and time. Gait: Gait normal.      Deep Tendon Reflexes: Reflexes normal.      Reflex Scores:       Patellar reflexes are 3+ on the right side and 3+ on the left side. Achilles reflexes are 2+ on the right side and 2+ on the left side. Psychiatric:         Mood and Affect: Mood normal.         Behavior: Behavior normal.      Advice/Referrals/Counseling   Education and counseling provided  Influenza Vaccine  Colorectal cancer screening tests  Cardiovascular screening blood test  Bone mass measurement (DEXA)  Assessment/Plan     Zeus Lundy was seen today for annual exam.    Diagnoses and all orders for this visit:    Routine physical examination    Migraine without aura, not intractable, without status migrainosus    Hyperglycemia    History of gestational diabetes  . Discussed BMI and healthy weight and diet, weight bearing exercise, smoking avoidance, sun protection and medication compliance.   The patient was counseled re screening procedures and recommended schedules for immunizations, mammography, Pap smears, GI hemoccult testing, colonoscopy and BMD.

## 2022-10-27 ENCOUNTER — TELEPHONE (OUTPATIENT)
Dept: INTERNAL MEDICINE CLINIC | Facility: CLINIC | Age: 52
End: 2022-10-27

## 2022-11-14 ENCOUNTER — APPOINTMENT (OUTPATIENT)
Dept: MAMMOGRAPHY | Age: 52
End: 2022-11-14
Payer: COMMERCIAL

## 2022-11-14 ENCOUNTER — HOSPITAL ENCOUNTER (OUTPATIENT)
Dept: MAMMOGRAPHY | Age: 52
Discharge: HOME | End: 2022-11-17
Payer: COMMERCIAL

## 2022-11-14 DIAGNOSIS — Z12.31 ENCOUNTER FOR SCREENING MAMMOGRAM FOR MALIGNANT NEOPLASM OF BREAST: ICD-10-CM

## 2022-11-14 PROCEDURE — 77067 SCR MAMMO BI INCL CAD: CPT

## 2022-11-21 DIAGNOSIS — Z00.00 LABORATORY EXAMINATION ORDERED AS PART OF A ROUTINE GENERAL MEDICAL EXAMINATION: ICD-10-CM

## 2022-11-25 PROBLEM — Z00.00 ROUTINE PHYSICAL EXAMINATION: Status: RESOLVED | Noted: 2022-10-26 | Resolved: 2022-11-25

## (undated) DEVICE — KENDALL SCD EXPRESS SLEEVES, KNEE LENGTH, MEDIUM: Brand: KENDALL SCD

## (undated) DEVICE — ADHESIVE SKIN CLOSURE 4X22 CM PREMIERPRO EXOFINFUSION DISP

## (undated) DEVICE — DRAPE TWL SURG 16X26IN BLU ORB04] ALLCARE INC]

## (undated) DEVICE — SUTURE VCRL + SZ 3-0 L18IN ABSRB UD SH 1/2 CIR TAPERCUT NDL VCP864D

## (undated) DEVICE — REM POLYHESIVE ADULT PATIENT RETURN ELECTRODE: Brand: VALLEYLAB

## (undated) DEVICE — WAX SURG 2.5GM HEMSTAT BNE BEESWAX PARAFFIN ISO PALMITATE

## (undated) DEVICE — 3M™ TEGADERM™ TRANSPARENT FILM DRESSING FRAME STYLE, 1628, 6 IN X 8 IN (15 CM X 20 CM), 10/CT 8CT/CASE: Brand: 3M™ TEGADERM™

## (undated) DEVICE — STERILE PACKAGE WITH CANNULA: Brand: LITE BIO DELIVERY SYSTEM

## (undated) DEVICE — DRAPE XR C ARM 41X74IN LF --

## (undated) DEVICE — TRAY CATH 16F URIN MTR LTX -- CONVERT TO ITEM 363111

## (undated) DEVICE — AGENT HEMSTAT W3XL4IN OXIDIZED REGENERATED CELOS ABSRB FOR

## (undated) DEVICE — SUTURE VCRL VIO BR 0 18IN C/R M04 J701D

## (undated) DEVICE — (D)PREP SKN CHLRAPRP APPL 26ML -- CONVERT TO ITEM 371833

## (undated) DEVICE — SOLUTION IV 1000ML 0.9% SOD CHL

## (undated) DEVICE — NEUROMONITORING KIT

## (undated) DEVICE — KIT POS PT CARE KT W/ GENTLE TCH WILSON +

## (undated) DEVICE — AGENT HEMSTAT 8ML FLX TIP MTRX + DISP SURGIFLO

## (undated) DEVICE — Z CONVERTED USE 2421973 CONTAINER SPEC 60/30ML 10% FRMLN POLYPR PREFIL

## (undated) DEVICE — 1840 FOAM BLOCK NEEDLE COUNTER: Brand: DEVON

## (undated) DEVICE — 2000CC GUARDIAN II: Brand: GUARDIAN

## (undated) DEVICE — BIPOLAR SEALER 23-112-1 AQM 6.0: Brand: AQUAMANTYS ®

## (undated) DEVICE — GOWN,PREVENTION PLUS,2XL,ST,22/CS: Brand: MEDLINE

## (undated) DEVICE — MEDI-VAC NON-CONDUCTIVE SUCTION TUBING: Brand: CARDINAL HEALTH

## (undated) DEVICE — PACKING 8004008 NEURAY 200PK 25X76MM: Brand: NEURAY ®

## (undated) DEVICE — 1010 S-DRAPE TOWEL DRAPE 10/BX: Brand: STERI-DRAPE™

## (undated) DEVICE — SYR 10ML LUER LOK 1/5ML GRAD --

## (undated) DEVICE — INSTRUMENT 8670015 PAK 2 NEEDLES TROCAR

## (undated) DEVICE — SURGIFOAM SPNG SZ 100

## (undated) DEVICE — INTENDED FOR TISSUE SEPARATION, AND OTHER PROCEDURES THAT REQUIRE A SHARP SURGICAL BLADE TO PUNCTURE OR CUT.: Brand: BARD-PARKER ® STAINLESS STEEL BLADES

## (undated) DEVICE — BUTTON SWITCH PENCIL BLADE ELECTRODE, HOLSTER: Brand: EDGE

## (undated) DEVICE — STANDARD HYPODERMIC NEEDLE,POLYPROPYLENE HUB: Brand: MONOJECT

## (undated) DEVICE — PACK PROCEDURE SURG POST LAMINECTOMY CDS